# Patient Record
Sex: FEMALE | Race: WHITE | Employment: FULL TIME | URBAN - METROPOLITAN AREA
[De-identification: names, ages, dates, MRNs, and addresses within clinical notes are randomized per-mention and may not be internally consistent; named-entity substitution may affect disease eponyms.]

---
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
## 2019-04-25 ENCOUNTER — HOSPITAL ENCOUNTER (INPATIENT)
Age: 19
LOS: 1 days | Discharge: HOME | DRG: 885 | End: 2019-04-26
Attending: EMERGENCY MEDICINE | Admitting: PSYCHIATRY & NEUROLOGY
Payer: COMMERCIAL

## 2019-04-25 DIAGNOSIS — F41.8 ANXIETY ASSOCIATED WITH DEPRESSION: Primary | ICD-10-CM

## 2019-04-25 PROBLEM — F32.9 MAJOR DEPRESSIVE DISORDER: Status: ACTIVE | Noted: 2019-04-25

## 2019-04-25 LAB
ALBUMIN SERPL-MCNC: 4.3 G/DL (ref 3.5–5)
ALBUMIN/GLOB SERPL: 1.3 {RATIO} (ref 1.1–2.2)
ALP SERPL-CCNC: 51 U/L (ref 45–117)
ALT SERPL-CCNC: 21 U/L (ref 12–78)
AMPHET UR QL SCN: NEGATIVE
ANION GAP SERPL CALC-SCNC: 4 MMOL/L (ref 5–15)
APAP SERPL-MCNC: <2 UG/ML (ref 10–30)
APPEARANCE UR: ABNORMAL
AST SERPL-CCNC: 15 U/L (ref 15–37)
BACTERIA URNS QL MICRO: ABNORMAL /HPF
BARBITURATES UR QL SCN: NEGATIVE
BASOPHILS # BLD: 0.1 K/UL (ref 0–0.1)
BASOPHILS NFR BLD: 1 % (ref 0–1)
BENZODIAZ UR QL: NEGATIVE
BILIRUB SERPL-MCNC: 1.5 MG/DL (ref 0.2–1)
BILIRUB UR QL: NEGATIVE
BUN SERPL-MCNC: 9 MG/DL (ref 6–20)
BUN/CREAT SERPL: 12 (ref 12–20)
CALCIUM SERPL-MCNC: 9.2 MG/DL (ref 8.5–10.1)
CANNABINOIDS UR QL SCN: POSITIVE
CHLORIDE SERPL-SCNC: 108 MMOL/L (ref 97–108)
CO2 SERPL-SCNC: 27 MMOL/L (ref 21–32)
COCAINE UR QL SCN: NEGATIVE
COLOR UR: ABNORMAL
CREAT SERPL-MCNC: 0.73 MG/DL (ref 0.55–1.02)
DIFFERENTIAL METHOD BLD: ABNORMAL
DRUG SCRN COMMENT,DRGCM: ABNORMAL
EOSINOPHIL # BLD: 0.6 K/UL (ref 0–0.4)
EOSINOPHIL NFR BLD: 6 % (ref 0–7)
EPITH CASTS URNS QL MICRO: ABNORMAL /LPF
ERYTHROCYTE [DISTWIDTH] IN BLOOD BY AUTOMATED COUNT: 12.2 % (ref 11.5–14.5)
ETHANOL SERPL-MCNC: <10 MG/DL
GLOBULIN SER CALC-MCNC: 3.3 G/DL (ref 2–4)
GLUCOSE SERPL-MCNC: 85 MG/DL (ref 65–100)
GLUCOSE UR STRIP.AUTO-MCNC: NEGATIVE MG/DL
HCG UR QL: NEGATIVE
HCT VFR BLD AUTO: 43 % (ref 35–47)
HGB BLD-MCNC: 14.5 G/DL (ref 11.5–16)
HGB UR QL STRIP: ABNORMAL
IMM GRANULOCYTES # BLD AUTO: 0 K/UL (ref 0–0.04)
IMM GRANULOCYTES NFR BLD AUTO: 0 % (ref 0–0.5)
KETONES UR QL STRIP.AUTO: NEGATIVE MG/DL
LEUKOCYTE ESTERASE UR QL STRIP.AUTO: ABNORMAL
LYMPHOCYTES # BLD: 2.2 K/UL (ref 0.8–3.5)
LYMPHOCYTES NFR BLD: 22 % (ref 12–49)
MCH RBC QN AUTO: 34.4 PG (ref 26–34)
MCHC RBC AUTO-ENTMCNC: 33.7 G/DL (ref 30–36.5)
MCV RBC AUTO: 102.1 FL (ref 80–99)
METHADONE UR QL: NEGATIVE
MONOCYTES # BLD: 0.6 K/UL (ref 0–1)
MONOCYTES NFR BLD: 6 % (ref 5–13)
NEUTS SEG # BLD: 6.2 K/UL (ref 1.8–8)
NEUTS SEG NFR BLD: 65 % (ref 32–75)
NITRITE UR QL STRIP.AUTO: NEGATIVE
NRBC # BLD: 0 K/UL (ref 0–0.01)
NRBC BLD-RTO: 0 PER 100 WBC
OPIATES UR QL: NEGATIVE
PCP UR QL: NEGATIVE
PH UR STRIP: 7.5 [PH] (ref 5–8)
PLATELET # BLD AUTO: 309 K/UL (ref 150–400)
PMV BLD AUTO: 10.5 FL (ref 8.9–12.9)
POTASSIUM SERPL-SCNC: 4 MMOL/L (ref 3.5–5.1)
PROT SERPL-MCNC: 7.6 G/DL (ref 6.4–8.2)
PROT UR STRIP-MCNC: NEGATIVE MG/DL
RBC # BLD AUTO: 4.21 M/UL (ref 3.8–5.2)
RBC #/AREA URNS HPF: ABNORMAL /HPF (ref 0–5)
SALICYLATES SERPL-MCNC: <1.7 MG/DL (ref 2.8–20)
SODIUM SERPL-SCNC: 139 MMOL/L (ref 136–145)
SP GR UR REFRACTOMETRY: 1.01 (ref 1–1.03)
TSH SERPL DL<=0.05 MIU/L-ACNC: 2.56 UIU/ML (ref 0.36–3.74)
UR CULT HOLD, URHOLD: NORMAL
UROBILINOGEN UR QL STRIP.AUTO: 0.2 EU/DL (ref 0.2–1)
WBC # BLD AUTO: 9.7 K/UL (ref 3.6–11)
WBC URNS QL MICRO: ABNORMAL /HPF (ref 0–4)

## 2019-04-25 PROCEDURE — 65220000003 HC RM SEMIPRIVATE PSYCH

## 2019-04-25 PROCEDURE — 90791 PSYCH DIAGNOSTIC EVALUATION: CPT

## 2019-04-25 PROCEDURE — 99284 EMERGENCY DEPT VISIT MOD MDM: CPT

## 2019-04-25 PROCEDURE — 87086 URINE CULTURE/COLONY COUNT: CPT

## 2019-04-25 PROCEDURE — 84443 ASSAY THYROID STIM HORMONE: CPT

## 2019-04-25 PROCEDURE — 74011250637 HC RX REV CODE- 250/637: Performed by: PSYCHIATRY & NEUROLOGY

## 2019-04-25 PROCEDURE — 80053 COMPREHEN METABOLIC PANEL: CPT

## 2019-04-25 PROCEDURE — 36415 COLL VENOUS BLD VENIPUNCTURE: CPT

## 2019-04-25 PROCEDURE — 80307 DRUG TEST PRSMV CHEM ANLYZR: CPT

## 2019-04-25 PROCEDURE — 81001 URINALYSIS AUTO W/SCOPE: CPT

## 2019-04-25 PROCEDURE — 85025 COMPLETE CBC W/AUTO DIFF WBC: CPT

## 2019-04-25 PROCEDURE — 81025 URINE PREGNANCY TEST: CPT

## 2019-04-25 RX ORDER — HYDROXYZINE 50 MG/1
50 TABLET, FILM COATED ORAL
Status: DISCONTINUED | OUTPATIENT
Start: 2019-04-25 | End: 2019-04-26 | Stop reason: HOSPADM

## 2019-04-25 RX ORDER — BENZTROPINE MESYLATE 2 MG/1
2 TABLET ORAL
Status: DISCONTINUED | OUTPATIENT
Start: 2019-04-25 | End: 2019-04-26 | Stop reason: HOSPADM

## 2019-04-25 RX ORDER — TRAZODONE HYDROCHLORIDE 50 MG/1
50 TABLET ORAL
Status: DISCONTINUED | OUTPATIENT
Start: 2019-04-25 | End: 2019-04-26 | Stop reason: HOSPADM

## 2019-04-25 RX ORDER — BENZTROPINE MESYLATE 1 MG/ML
2 INJECTION INTRAMUSCULAR; INTRAVENOUS
Status: DISCONTINUED | OUTPATIENT
Start: 2019-04-25 | End: 2019-04-26 | Stop reason: HOSPADM

## 2019-04-25 RX ORDER — ADHESIVE BANDAGE
30 BANDAGE TOPICAL DAILY PRN
Status: DISCONTINUED | OUTPATIENT
Start: 2019-04-25 | End: 2019-04-26 | Stop reason: HOSPADM

## 2019-04-25 RX ORDER — LORAZEPAM 2 MG/ML
2 INJECTION INTRAMUSCULAR
Status: DISCONTINUED | OUTPATIENT
Start: 2019-04-25 | End: 2019-04-26 | Stop reason: HOSPADM

## 2019-04-25 RX ORDER — ACETAMINOPHEN 325 MG/1
650 TABLET ORAL
Status: DISCONTINUED | OUTPATIENT
Start: 2019-04-25 | End: 2019-04-26 | Stop reason: HOSPADM

## 2019-04-25 RX ORDER — IBUPROFEN 400 MG/1
400 TABLET ORAL
Status: DISCONTINUED | OUTPATIENT
Start: 2019-04-25 | End: 2019-04-26 | Stop reason: HOSPADM

## 2019-04-25 RX ORDER — OLANZAPINE 5 MG/1
5 TABLET ORAL
Status: DISCONTINUED | OUTPATIENT
Start: 2019-04-25 | End: 2019-04-26 | Stop reason: HOSPADM

## 2019-04-25 RX ORDER — ONDANSETRON 4 MG/1
4 TABLET, ORALLY DISINTEGRATING ORAL
Status: DISCONTINUED | OUTPATIENT
Start: 2019-04-25 | End: 2019-04-26 | Stop reason: HOSPADM

## 2019-04-25 RX ORDER — DIPHENHYDRAMINE HCL 25 MG
50 CAPSULE ORAL
Status: DISCONTINUED | OUTPATIENT
Start: 2019-04-25 | End: 2019-04-26 | Stop reason: HOSPADM

## 2019-04-25 RX ORDER — IBUPROFEN 200 MG
1 TABLET ORAL
Status: DISCONTINUED | OUTPATIENT
Start: 2019-04-25 | End: 2019-04-26 | Stop reason: HOSPADM

## 2019-04-25 RX ADMIN — HYDROXYZINE HYDROCHLORIDE 50 MG: 50 TABLET, FILM COATED ORAL at 20:37

## 2019-04-25 RX ADMIN — ONDANSETRON 4 MG: 4 TABLET, ORALLY DISINTEGRATING ORAL at 20:28

## 2019-04-25 NOTE — ED PROVIDER NOTES
22 y.o. female with no significant past medical history who presents from [address] [place] [address] with chief complaint of depression. Per mom, the pt has been experiencing some worsening anxiety over the past couple of weeks. She had been experiencing some GI issues and had a full w/u, including EGD with Dr. Charmayne Phalen which was all normal and ended up being attributed to emotional issues. The pt is not established with a PCP so the mom took her to [address] [place] [address] today to speak with someone about her anxiety. While there, they spoke to the  who was going to set the patient up in a Partial Hospitalization Program. Once she brought in the paperwork to the pt, the pt became upset and things escalated with the family. The coordinator then spoke to the pt privately where she was asked if she had any thoughts of harming herself or others. The pt states that she mentioned that \"life may be easier if (she) wasn't here;\" however, she denied any HI or SI, stating \"(she) wouldn't ever try to hurt (herself) or others. \" The pt and family were then told that if they did not bring her here for admission she would be TDO. Pt and mother state that they do not think the pt is suicidal and does not need to be admitted. Pt denies SI or HI. There are no other acute medical concerns at this time. Social hx: current everyday tobacco smoker (0.25 packs/day); (+) EtOH use (once/week); (+) marijuana use (Daily) PCP: Dangelo Gutierrez MD 
 
Note written by Lucius Seaman, as dictated by Ana Kelley MD 1:00 PM 
 
The history is provided by the patient and a parent. No  was used. History reviewed. No pertinent past medical history. Past Surgical History:  
Procedure Laterality Date  HX HEENT    
 wisdom teeth  UPPER GI ENDOSCOPY,BIOPSY  11/16/2018 Family History:  
Problem Relation Age of Onset  Other Mother   
     divericulitis, gallbladder removal  
  No Known Problems Father  Crohn's Disease Maternal Grandmother 48  
 Other Maternal Grandmother   
     divericulitis and gallbladder removal  
 Stroke Maternal Grandfather  Hypertension Maternal Grandfather  High Cholesterol Maternal Grandfather  Cataract Maternal Grandfather  Stroke Paternal Grandmother  Cancer Paternal Grandmother 79  
     kidney, breast---age 61  No Known Problems Paternal Grandfather Social History Socioeconomic History  Marital status: SINGLE Spouse name: Not on file  Number of children: Not on file  Years of education: Not on file  Highest education level: Not on file Occupational History  Not on file Social Needs  Financial resource strain: Not on file  Food insecurity:  
  Worry: Not on file Inability: Not on file  Transportation needs:  
  Medical: Not on file Non-medical: Not on file Tobacco Use  Smoking status: Current Every Day Smoker Packs/day: 0.25 Years: 1.00 Pack years: 0.25  Smokeless tobacco: Never Used Substance and Sexual Activity  Alcohol use: Yes Comment: once a week  Drug use: Yes Frequency: 7.0 times per week Types: Marijuana  Sexual activity: Yes  
  Partners: Male Birth control/protection: IUD Lifestyle  Physical activity:  
  Days per week: Not on file Minutes per session: Not on file  Stress: Not on file Relationships  Social connections:  
  Talks on phone: Not on file Gets together: Not on file Attends [employer] service: Not on file Active member of club or organization: Not on file Attends meetings of clubs or organizations: Not on file Relationship status: Not on file  Intimate partner violence:  
  Fear of current or ex partner: Not on file Emotionally abused: Not on file Physically abused: Not on file Forced sexual activity: Not on file Other Topics Concern  Not on file Social History Narrative  Not on file ALLERGIES: Patient has no known allergies. Review of Systems Constitutional: Negative for appetite change, chills and fever. HENT: Negative for rhinorrhea, sore throat and trouble swallowing. Eyes: Negative for photophobia. Respiratory: Negative for cough and shortness of breath. Cardiovascular: Negative for chest pain and palpitations. Gastrointestinal: Negative for abdominal pain, nausea and vomiting. Genitourinary: Negative for dysuria, frequency and hematuria. Musculoskeletal: Negative for arthralgias. Neurological: Negative for dizziness, syncope and weakness. Psychiatric/Behavioral: Negative for behavioral problems. The patient is nervous/anxious. All other systems reviewed and are negative. Vitals:  
 04/25/19 1127 04/25/19 1152 BP:  124/83 Pulse: 98 90 Resp:  18 Temp:  98.7 °F (37.1 °C) SpO2: 100% 100% Physical Exam  
Constitutional: She appears well-developed and well-nourished. HENT:  
Head: Normocephalic and atraumatic. Mouth/Throat: Oropharynx is clear and moist.  
Eyes: Pupils are equal, round, and reactive to light. EOM are normal.  
Neck: Normal range of motion. Neck supple. Cardiovascular: Normal rate, regular rhythm, normal heart sounds and intact distal pulses. Exam reveals no gallop and no friction rub. No murmur heard. Pulmonary/Chest: Effort normal. No respiratory distress. She has no wheezes. She has no rales. Abdominal: Soft. There is no tenderness. There is no rebound. Musculoskeletal: Normal range of motion. She exhibits no tenderness. Neurological: She is alert. No cranial nerve deficit. Motor; symmetric Skin: No erythema. Psychiatric: Her behavior is normal. Cognition and memory are normal. She exhibits a depressed mood. Behavior is calm and cooperative. Nursing note and vitals reviewed.  
  
Note written by Lucius Qiu, as dictated by Libbie Litten, MD 1:00 PM 
 
MDM Procedures PROGRESS NOTE: 
1:10 PM 
Dioni Ramires has spoken to [address] [place] [address] Pkwy. The pt's disposition is voluntary admission or TDO. They plan to admit the pt. PROGRESS NOTE: 
2:24 PM 
Pt is medically cleared.

## 2019-04-25 NOTE — ED NOTES
RN at patient's bedside to draw blood. Mother stating, \"Is all of this really nessesary? If she is not a TDO or suicidal do we really need to be here? I mean, I feel like this escalated really quickly\". RN made BSMART aware, BSMART in to speak with patient and parents.
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
## 2019-04-25 NOTE — ROUTINE PROCESS
TRANSFER - OUT REPORT: 
 
Verbal report given to Diego Vick RN (name) on Elizabeth Flow  being transferred to Room 728B (unit) for routine progression of care Report consisted of patients Situation, Background, Assessment and  
Recommendations(SBAR). Information from the following report(s) SBAR, Kardex, ED Summary and Recent Results was reviewed with the receiving nurse. Lines:    
 
Opportunity for questions and clarification was provided. Patient transported with: 
 Registered Nurse and HPD
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
## 2019-04-25 NOTE — PROGRESS NOTES
Admission Medication Reconciliation: 
Information obtained from: Patient in presence of her parents Significant PMH/Disease States:  
History reviewed. No pertinent past medical history. Chief Complaint for this Admission:  Depression Allergies:  Patient has no known allergies. Prior to Admission Medications:  
Prior to Admission Medications Prescriptions Last Dose Informant Patient Reported? Taking?  
levonorgestrel (MIRENA) 20 mcg/24 hr (5 years) IUD   Yes Yes Si Each by IntraUTERine route once. Facility-Administered Medications: None Comments/Recommendations: Patient provided information in presence of her parents. Notes: 
THC: smokes about 4x/week, \"several hits at a time\" Nicotine: smokes ~1 pack/day, may benefit from nicotine replacement therapy while in-patient Deleted: 1. Omeprazole 2. Ondansetron 3. Pantoprazole Thank you for allowing me to participate in the care of your patient. Karson HahnD, RN #[id number]

## 2019-04-25 NOTE — BSMART NOTE
Comprehensive Assessment Form Part 1 Section I - Disposition Axis I - Major Depression; Recurrent, Severe Generalized Anxiety Disorder Axis II - R/O Borderline Personality Disorder Axis III - History reviewed. No pertinent past medical history. Axis IV - substance abuse, family discord, workplace stress [place] V - 45-50 The Medical Doctor to Psychiatrist conference was not completed. The Medical Doctor is in agreement with Psychiatrist disposition because of (reason) they agree with the dispotition. The plan is admission to the behavioral health unit. The admitting Psychiatrist will be Dr. Basilia Jaime. The admitting Diagnosis is Major Depression. The Payor source is [employer]. The name of the representative was Merlinda Kelp. This was approved for 7 days. The authorization number is [id number]. Section II - Integrated Summary Summary:  Writer met with this patient face to face at [facility] emergency department. She was sent to the hospital for admission. The patient states that for the past year, she has woken up in the morning for work and has vomited. She attributes this to anxiety about going to work which is currently a major stressor for her. Her appetite has been poor and she has lost 40 pounds in three months time. She endorses passive suicidal ideation which she describes as \"things would be better if I wasn't here\". She denied homicidal ideation and perceptual disruption. Writer spoke to the patient and explained my concerns for her well being involving her weight loss and vomiting. It was explained to her that since she had been cleared by GI, and her gastric symptoms were caused by severe mental health symptoms, it was my recommendation that she be admitted to the hospital. She is voluntary for admission at this time. The patient has demonstrated mental capacity to provide informed consent. The information is given by the patient, parent and Beto Moya The Chief Complaint is depression`. The Precipitant Factors are see axis IV. Previous Hospitalizations: none The patient has not previously been in restraints. Current Psychiatrist and/or  is patient appeared for intake at [facility] today. Lethality Assessment: 
 
The potential for suicide noted by the following: ideation . The potential for homicide is not noted. The patient has not been a perpetrator of sexual or physical abuse. There are not pending charges. The patient is felt to be at risk for self harm or harm to others. The attending nurse was advised that security has been notified. Section III - Psychosocial 
The patient's overall mood and attitude is depressed. Feelings of helplessness and hopelessness are not observed. Generalized anxiety is observed by self report. Panic is observed by self report. Phobias are not observed. Obsessive compulsive tendencies are not observed. Section IV - Mental Status Exam 
The patient's appearance is unkempt. The patient's behavior is guarded. The patient is oriented to time, place, person and situation. The patient's speech shows no evidence of impairment. The patient's mood is depressed and is irritable. The range of affect is labile. The patient's thought content demonstrates no evidence of impairment. The thought process shows no evidence of impairment. The patient's perception shows no evidence of impairment. The patient's memory shows no evidence of impairment. The patient's appetite is decreased and shows signs of weight loss. The patient's sleep shows no evidence of impairment. The patient's insight is blaming. The patient's judgement is psychologically impaired. Section V - Substance Abuse The patient is using substances. The patient's urine drug screen was positive for THC. Section VI - Living Arrangements The patient is single. The patient lives with a parent.  The patient has no children. The patient does plan to return home upon discharge. The patient does not have legal issues pending. The patient's source of income comes from employment. Yazidism and cultural practices have not been voiced at this time. The patient's greatest support comes from her father and this person will be involved with the treatment. The patient has not been in an event described as horrible or outside the realm of ordinary life experience either currently or in the past. 
The patient has not been a victim of sexual/physical abuse. Section VII - Other Areas of Clinical Concern The highest grade achieved is 12th grade with the overall quality of school experience being described as not assessed. The patient is currently unemployed and speaks [place] as a primary language. The patient has no communication impairments affecting communication. The patient's preference for learning can be described as: can read and write adequately. The patient's hearing is normal.  The patient's vision is impaired and  wears glasses or contacts. ERIC Peralta, Supervisee in Social Work

## 2019-04-26 VITALS
HEART RATE: 82 BPM | DIASTOLIC BLOOD PRESSURE: 65 MMHG | SYSTOLIC BLOOD PRESSURE: 99 MMHG | TEMPERATURE: 97.9 F | BODY MASS INDEX: 23.1 KG/M2 | HEIGHT: 61 IN | OXYGEN SATURATION: 100 % | RESPIRATION RATE: 16 BRPM

## 2019-04-26 LAB
BACTERIA SPEC CULT: NORMAL
CC UR VC: NORMAL
SERVICE CMNT-IMP: NORMAL

## 2019-04-26 RX ORDER — ESCITALOPRAM OXALATE 10 MG/1
10 TABLET ORAL DAILY
Qty: 30 TAB | Refills: 0 | Status: SHIPPED | OUTPATIENT
Start: 2019-04-26 | End: 2019-04-26 | Stop reason: SDUPTHER

## 2019-04-26 RX ORDER — ESCITALOPRAM OXALATE 10 MG/1
10 TABLET ORAL DAILY
Qty: 30 TAB | Refills: 0 | Status: SHIPPED | OUTPATIENT
Start: 2019-04-26 | End: 2019-07-02 | Stop reason: DRUGHIGH

## 2019-04-26 NOTE — BH NOTES
GROUP THERAPY PROGRESS NOTE Ankita Schaeffer did not participate in a 75 minute Process Group on the General Unit with a focus identifying feelings, planning for the day, and learning about using the [address] as a long-term personal treatment plan. She may have been finalizing her discharge plans with nursing.

## 2019-04-26 NOTE — INTERDISCIPLINARY ROUNDS
Behavioral Health Interdisciplinary Rounds Patient Name: Adamaris Valenzuela  Age: 22 y.o. Room/Bed:  727/ Primary Diagnosis: <principal problem not specified> Admission Status: Voluntary / has been assessed at [facility] Readmission within 30 days: no 
Power of  in place: no  
Patient requires a blocked bed: no          Reason for blocked bed: VTE Prophylaxis: No 
 
Mobility needs/Fall risk: no 
Flu Vaccine : no  
Nutritional Plan: no 
Consults:       
Labs/Testing due today?: yes Sleep hours: 7 1/2 Participation in Care/Groups:  no 
Medication Compliant?: Yes PRNS (last 24 hours): Antianxiety and Sleep Aid Restraints (last 24 hours):  no 
  
CIWA (range last 24 hours): COWS (range last 24 hours): Alcohol screening (AUDIT) completed -   AUDIT Score: 2 If applicable, date SBIRT discussed in treatment team AND documented:  
AUDIT Screen Score: AUDIT Score: 2 Tobacco - patient is a smoker: Have You Used Tobacco in the Past 30 Days: Yes Illegal Drugs use: Have You Used Any Illegal Substances Over the Past 12 Months: Yes 
 
24 hour chart check complete:yes Patient goal(s) for today: Dev course of tx Treatment team focus/goals:  Complete Psych Social Assessment Progress note LOS:  1  Expected LOS:  
 
Financial concerns/prescription coverage:  
Date of last family contact:   
Family requesting physician contact today:  
Discharge plan: Return to live with family Guns in the home: N/A Outpatient provider(s): Beto MATHIS ( completed Intake) Participating treatment team members: BRET Justice RN, Jones HahnD

## 2019-04-26 NOTE — PROGRESS NOTES
Problem: Discharge Planning Goal: *Discharge to safe environment Outcome: Not Progressing Towards Goal 
Note: Pt plans to return home to live with family Goal: *Knowledge of medication management Outcome: Not Progressing Towards Goal 
Note: Pt will take all scheduled medications Pt know the names and usgae of all her medications Goal: *Knowledge of discharge instructions Outcome: Not Progressing Towards Goal 
Note: Pt will participate in d/c planning with Tx team by articulating her needs and concerns Problem: Patient Education: Go to Patient Education Activity Goal: Patient/Family Education Outcome: Not Progressing Towards Goal 
Note: Pt will attend all scheduled activities to gain knowledge of managing her illness and develop recovery plans

## 2019-04-26 NOTE — BH NOTES
Behavioral Health Transition Record to Provider Patient Name: Shawn Batista YOB: 2000 Medical Record Number: [id number] Date of Admission: 4/25/2019 Date of Discharge: 4/26/2019 Attending Provider: Nestor Costa MD 
Discharging Provider: Nestor Costa MD 
To contact this individual call [phone] and ask the  to page. If unavailable, ask to be transferred to [facility] Provider on call. [facility] Provider will be available on call 24/7 and during holidays. Primary Care Provider: Robinson Saxena MD 
 
No Known Allergies Reason for Admission: Anxiety Admission Diagnosis: Major depressive disorder [F32.9] Major depressive disorder [F32.9] Major depressive disorder [F32.9] * No surgery found * Results for orders placed or performed during the hospital encounter of 04/25/19 URINE CULTURE HOLD SAMPLE Result Value Ref Range Urine culture hold URINE ON HOLD IN MICROBIOLOGY DEPT FOR 3 DAYS. IF UNPRESERVED URINE IS SUBMITTED, IT CANNOT BE USED FOR ADDITIONAL TESTING AFTER 24 HRS, RECOLLECTION WILL BE REQUIRED. CULTURE, URINE Result Value Ref Range Special Requests: NO SPECIAL REQUESTS [place] Count <10,000 COLONIES/mL Culture result: NO SIGNIFICANT GROWTH URINALYSIS W/MICROSCOPIC Result Value Ref Range Color YELLOW/STRAW Appearance CLOUDY (A) CLEAR Specific gravity 1.013 1.003 - 1.030    
 pH (UA) 7.5 5.0 - 8.0 Protein NEGATIVE  NEG mg/dL Glucose NEGATIVE  NEG mg/dL Ketone NEGATIVE  NEG mg/dL Bilirubin NEGATIVE  NEG Blood MODERATE (A) NEG Urobilinogen 0.2 0.2 - 1.0 EU/dL Nitrites NEGATIVE  NEG Leukocyte Esterase TRACE (A) NEG    
 WBC 5-10 0 - 4 /hpf  
 RBC 5-10 0 - 5 /hpf Epithelial cells MANY (A) FEW /lpf Bacteria 1+ (A) NEG /hpf  
CBC WITH AUTOMATED DIFF Result Value Ref Range WBC 9.7 3.6 - 11.0 K/uL  
 RBC 4.21 3.80 - 5.20 M/uL  
 HGB 14.5 11.5 - 16.0 g/dL HCT 43.0 35.0 - 47.0 % .1 (H) 80.0 - 99.0 FL  
 MCH 34.4 (H) 26.0 - 34.0 PG  
 MCHC 33.7 30.0 - 36.5 g/dL  
 RDW 12.2 11.5 - 14.5 % PLATELET 338 300 - 019 K/uL MPV 10.5 8.9 - 12.9 FL  
 NRBC 0.0 0  WBC ABSOLUTE NRBC 0.00 0.00 - 0.01 K/uL NEUTROPHILS 65 32 - 75 % LYMPHOCYTES 22 12 - 49 % MONOCYTES 6 5 - 13 % EOSINOPHILS 6 0 - 7 % BASOPHILS 1 0 - 1 % IMMATURE GRANULOCYTES 0 0.0 - 0.5 % ABS. NEUTROPHILS 6.2 1.8 - 8.0 K/UL  
 ABS. LYMPHOCYTES 2.2 0.8 - 3.5 K/UL  
 ABS. MONOCYTES 0.6 0.0 - 1.0 K/UL  
 ABS. EOSINOPHILS 0.6 (H) 0.0 - 0.4 K/UL  
 ABS. BASOPHILS 0.1 0.0 - 0.1 K/UL  
 ABS. IMM. GRANS. 0.0 0.00 - 0.04 K/UL  
 DF AUTOMATED METABOLIC PANEL, COMPREHENSIVE Result Value Ref Range Sodium 139 136 - 145 mmol/L Potassium 4.0 3.5 - 5.1 mmol/L Chloride 108 97 - 108 mmol/L  
 CO2 27 21 - 32 mmol/L Anion gap 4 (L) 5 - 15 mmol/L Glucose 85 65 - 100 mg/dL BUN 9 6 - 20 MG/DL Creatinine 0.73 0.55 - 1.02 MG/DL  
 BUN/Creatinine ratio 12 12 - 20 GFR est AA >60 >60 ml/min/1.73m2 GFR est non-AA >60 >60 ml/min/1.73m2 Calcium 9.2 8.5 - 10.1 MG/DL Bilirubin, total 1.5 (H) 0.2 - 1.0 MG/DL  
 ALT (SGPT) 21 12 - 78 U/L  
 AST (SGOT) 15 15 - 37 U/L Alk. phosphatase 51 45 - 117 U/L Protein, total 7.6 6.4 - 8.2 g/dL Albumin 4.3 3.5 - 5.0 g/dL Globulin 3.3 2.0 - 4.0 g/dL A-G Ratio 1.3 1.1 - 2.2 ETHYL ALCOHOL Result Value Ref Range ALCOHOL(ETHYL),SERUM <80 <57 MG/DL  
SALICYLATE Result Value Ref Range Salicylate level <7.7 (L) 2.8 - 20.0 MG/DL  
ACETAMINOPHEN Result Value Ref Range Acetaminophen level <2 (L) 10 - 30 ug/mL DRUG SCREEN, URINE Result Value Ref Range AMPHETAMINES NEGATIVE  NEG    
 BARBITURATES NEGATIVE  NEG BENZODIAZEPINES NEGATIVE  NEG    
 COCAINE NEGATIVE  NEG  METHADONE NEGATIVE  NEG    
 OPIATES NEGATIVE  NEG    
 PCP(PHENCYCLIDINE) NEGATIVE  NEG    
 THC (TH-CANNABINOL) POSITIVE (A) NEG Drug screen comment (NOTE) TSH 3RD GENERATION Result Value Ref Range TSH 2.56 0.36 - 3.74 uIU/mL  
HCG URINE, QL. - POC Result Value Ref Range Pregnancy test,urine (POC) NEGATIVE  NEG Immunizations administered during this encounter: There is no immunization history on file for this patient. Screening for Metabolic Disorders for Patients on Antipsychotic Medications 
(Data obtained from the EMR) Estimated Body Mass Index Estimated body mass index is 23.1 kg/m² as calculated from the following: 
  Height as of this encounter: 5' 1\" (1.549 m). Weight as of 18: 55.5 kg (122 lb 4 oz). Vital Signs/Blood Pressure Visit Vitals BP 99/65 Pulse 82 Temp 97.9 °F (36.6 °C) Resp 16 Ht 5' 1\" (1.549 m) Comment: drivers license SpO2 100% Breastfeeding? No  
BMI 23.10 kg/m² Blood Glucose/Hemoglobin A1c Lab Results Component Value Date/Time Glucose 85 2019 12:35 PM  
 
No results found for: HBA1C, HGBE8, CRT3ZCAX Lipid Panel No results found for: CHOL, CHOLX, CHLST, [address], [id number], HDL, LDL, LDLC, DLDLP, TGLX, TRIGL, TRIGP, CHHD, CHHDX Discharge Diagnosis: Major depressive disorder (ICD-10-CM: F32.9) Discharge Plan: Patient discharged home into the care of her mother. [address] [place] [address] : 2000 MRN: [id number] The patient Tenzin Andrews exhibits the ability to control behavior in a less restrictive environment. Patient's level of functioning is improving. No assaultive/destructive behavior has been observed for the past 24 hours. No suicidal/homicidal threat or behavior has been observed for the past 24 hours. There is no evidence of serious medication side effects. Patient has not been in physical or protective restraints for at least the past 24 hours. If weapons involved, how are they secured? No weapons involved. Is patient aware of and in agreement with discharge plan? Yes Arrangements for medication:  Prescriptions given to patient. Copy of discharge instructions to provider?:  N/A Arrangements for transportation home:  Family to . Keep all follow up appointments as scheduled, continue to take prescribed medications per physician instructions. Mental health crisis number:  771 or your local mental health crisis line number at [phone]. Discharge Medication List and Instructions:  
Discharge Medication List as of 4/26/2019 10:38 AM  
  
CONTINUE these medications which have CHANGED Details  
escitalopram oxalate (LEXAPRO) 10 mg tablet Take 1 Tab by mouth daily. Indications: major depressive disorder, Print, Disp-30 Tab, R-0  
  
  
CONTINUE these medications which have NOT CHANGED Details  
levonorgestrel (MIRENA) 20 mcg/24 hr (5 years) IUD 1 Each by IntraUTERine route once., Historical Med  
  
  
 
 
Unresulted Labs (24h ago, onward) None To obtain results of studies pending at discharge, please contact [phone] Follow-up Information Follow up With Specialties Details Why Contact Info [facility]  Schedule an appointment as soon as possible for a visit Individual therapy [address], [place] [address] [facility], [address] [facility] 
[phone] 
[email or web address]  
 2020 [facility]  Schedule an appointment as soon as possible for a visit Individual therapy 3 [facility] [address], [address] 1400 [facility] [address] [place] [address] 
[phone] 
[email or web address]  
 [facility]  Schedule an appointment as soon as possible for a visit Primary care provider [address] [place] [address] 
[phone] Miguel Light MD Pediatrics   [address] [facility] 
[phone] Advanced Directive:  
Does the patient have an appointed surrogate decision maker?  No 
 Does the patient have a Medical Advance Directive? No 
Does the patient have a Psychiatric Advance Directive? No 
If the patient does not have a surrogate or Medical Advance Directive AND Psychiatric Advance Directive, the patient was offered information on these advance directives Yes and Patient declined to complete Patient Instructions: Please continue all medications until otherwise directed by physician. Tobacco Cessation Discharge Plan:  
Is the patient a smoker and needs referral for smoking cessation? Yes Patient referred to the following for smoking cessation with an appointment? Refused Patient was offered medication to assist with smoking cessation at discharge? Refused Was education for smoking cessation added to the discharge instructions? Yes Alcohol/Substance Abuse Discharge Plan:  
Does the patient have a history of substance/alcohol abuse and requires a referral for treatment? Yes Patient referred to the following for substance/alcohol abuse treatment with an appointment? Refused Patient was offered medication to assist with alcohol cessation at discharge? Refused Was education for substance/alcohol abuse added to discharge instructions? Yes Patient discharged to Home; discussed with patient/caregiver and provided to the patient/caregiver either in hard copy or electronically. PATIENT REFUSED ALL RECOMMENDED FOLLOW-UP. PATIENT REPORTED NO CURRENT PCP. NO PRACTICE AVAILABLE TO ROUTE RECORD.

## 2019-04-26 NOTE — BH NOTES
GROUP THERAPY PROGRESS NOTE [place] did not  Participated in group. Group time: 15 minutes Personal goal for participation: 
 
Goal orientation:  
Group therapy participation:  
 
Therapeutic interventions reviewed and discussed:  
 
Impression of participation:

## 2019-04-26 NOTE — BH NOTES
PSYCHOSOCIAL ASSESSMENT 
:Patient identifying info: 
Gio Barry is a 22 y.o., female admitted 4/25/2019 11:45 AM  
 
Presenting problem and precipitating factors: Pt admitted to the hospital due to anxiety, loss of weight , stress related to employment and medical issues. Pt also endorsed passive SI stating things would be better if she wasn't here  ( no plan) Mental status assessment:  Alert, oriented, calm, cooperative Collateral information: Anamaria Moy (mother [phone]) Current psychiatric /substance abuse providers and contact info: Beto MATHIS - Completed Intake - No assigned cm nor psych Previous psychiatric/substance abuse providers and response to treatment: None Family history of mental illness or substance abuse:  
 
Substance abuse history:  Yes, ETOH , tobacco, THC - daily and weekly UDS was + THC Social History Tobacco Use  Smoking status: Current Every Day Smoker Packs/day: 0.25 Years: 1.00 Pack years: 0.25  Smokeless tobacco: Never Used Substance Use Topics  Alcohol use: Yes Comment: once a week History of biomedical complications associated with substance abuse: Denies Patient's current acceptance of treatment or motivation for change: Anita Coreas Family constellation: Parents Is significant other involved? No 
 
 
Describe support system: Parents provides her greatest source of support Describe living arrangements and home environment: Pt is single, has no children and she lives with her father. Pt has expressed plans to return home to her family. Health issues: Review H&P Hospital Problems  Date Reviewed: 1/19/2017 Codes Class Noted POA Major depressive disorder ICD-10-CM: F32.9 ICD-9-CM: 296.20  4/25/2019 Unknown Trauma history: Denied Legal issues: No current legal issues History of  service: N/A Financial status: Employment Sikh/cultural factors:  Non   
 
 Education/work history: HS grad and currently employed at [employer] Have you been licensed as a health care professional (current or ): No 
 
Leisure and recreation preferences:  
 
Describe coping skills:  Ineffective and poor judgement Karl Long 2019

## 2019-04-26 NOTE — PROGRESS NOTES
[address] [facility] 60 Master Treatment Plan for Diana Sánchez Treatment Plan Initiated: 4/26/19 Treatment Plan Modalities: 
Type of Modality Amount (x minutes) Frequency (x/week) Duration (x days) Name of Responsible Staff Community & wrap-up meetings to encourage peer interactions [id number] [facility] Group psychotherapy to assist in building coping skills and internal controls 60 7 [address] [facility] Therapeutic activity groups to build coping skills 60 7 1 Elton Griffin Psychoeducation in group setting to address:  
Medication education 70 Anjelica Guzmán Coping skills Relaxation techniques Symptom management Discharge planning [address] [place] [address] 71 Spirituality 2[address] [facility] Cassy Crew 60 1 1 volunteer Recovery/AA/NA 
    volunteer Physician medication management 15 7 1 Dr. Jamal Oscar Family meeting/discharge planning [facility] THESE GOALS WILL BE MET BY 4/29/19 Problem: Depressed Mood (Adult/Pediatric) Goal: *STG: Participates in treatment plan Outcome: Progressing Towards Goal 
Note:  
AOx4, affect bright full range, Mood describes feeling stable, with increase anxiety that fluctuates with currently level being tolerable. THC use x4 week. Denies SI, no self harming behaviors. Denies striking her mother yesterday at her [facility]. Future focused and states purpose of entering [facility] and ED was to get a therapy appointment. Describes being able to function however feeling not herself with noted weight loss in past 3 months. Increase crying and morning depression. Daily goal is to d/c home. Staff focus is on offering support, coping skills education and reassurance. Goal: *STG: Verbalizes anger, guilt, and other feelings in a constructive manor Outcome: Progressing Towards Goal 
Goal: *STG: Attends activities and groups Outcome: Progressing Towards Goal 
 Goal: *STG: Demonstrates reduction in symptoms and increase in insight into coping skills/future focused Outcome: Progressing Towards Goal 
Goal: Interventions Outcome: Progressing Towards Goal

## 2019-04-26 NOTE — PROGRESS NOTES
Pharmacist Discharge Medication Reconciliation Discharging Provider: Dr. Basilia Jaime Significant PMH:  
Past Medical History:  
Diagnosis Date Major depressive disorder 4/25/2019 Chief Complaint for this Admission: Chief Complaint Patient presents with Depression Allergies: Patient has no known allergies. Discharge Medications:  
Current Discharge Medication List  
  
 
START taking these medications Details  
escitalopram oxalate (LEXAPRO) 10 mg tablet Take 1 Tab by mouth daily. Indications: major depressive disorder 
Qty: 30 Tab, Refills: 0 CONTINUE these medications which have NOT CHANGED Details  
levonorgestrel (MIRENA) 20 mcg/24 hr (5 years) IUD 1 Each by IntraUTERine route once.   
  
  
 
The patient's chart, MAR and AVS were reviewed by Christophe Kasper PHARMD.

## 2019-04-26 NOTE — PROGRESS NOTES
Problem: Anxiety-Behavioral Health (Adult/Pediatric) Goal: *STG/LTG: Complies with medication therapy Outcome: Not Progressing Towards Goal 
Variance Patient Condition 2037  Pt is anxious, tearful and sweating. Vistaril, 50 mg given.
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
## 2019-04-26 NOTE — DISCHARGE INSTRUCTIONS
DISCHARGE SUMMARY    Ángel Gilmore  : 2000  MRN: [id number]    The patient Anselmo Villa exhibits the ability to control behavior in a less restrictive environment. Patient's level of functioning is improving. No assaultive/destructive behavior has been observed for the past 24 hours. No suicidal/homicidal threat or behavior has been observed for the past 24 hours. There is no evidence of serious medication side effects. Patient has not been in physical or protective restraints for at least the past 24 hours. If weapons involved, how are they secured? No weapons involved. Is patient aware of and in agreement with discharge plan? Yes    Arrangements for medication:  Prescriptions given to patient. Copy of discharge instructions to provider?:  N/A    Arrangements for transportation home:  Family to . Keep all follow up appointments as scheduled, continue to take prescribed medications per physician instructions. Mental health crisis number:  514 or your local mental health crisis line number at [phone]. DISCHARGE SUMMARY from Nurse    PATIENT INSTRUCTIONS:    What to do at Home:  Recommended activity: Activity as tolerated,     If you experience any of the following symptoms thoughts of harming self, feeling overwhelmed with hopelessness and despair, please follow up with  your local crisis humber at [phone]. *  Please give a list of your current medications to your Primary Care Provider. *  Please update this list whenever your medications are discontinued, doses are      changed, or new medications (including over-the-counter products) are added. *  Please carry medication information at all times in case of emergency situations.     These are general instructions for a healthy lifestyle:    No smoking/ No tobacco products/ Avoid exposure to second hand smoke  Surgeon General's Warning:  Quitting smoking now greatly reduces serious risk to your health. Obesity, smoking, and sedentary lifestyle greatly increases your risk for illness    A healthy diet, regular physical exercise & weight monitoring are important for maintaining a healthy lifestyle    You may be retaining fluid if you have a history of heart failure or if you experience any of the following symptoms:  Weight gain of 3 pounds or more overnight or 5 pounds in a week, increased swelling in our hands or feet or shortness of breath while lying flat in bed. Please call your doctor as soon as you notice any of these symptoms; do not wait until your next office visit. Recognize signs and symptoms of STROKE:    F-face looks uneven    A-arms unable to move or move unevenly    S-speech slurred or non-existent    T-time-call 911 as soon as signs and symptoms begin-DO NOT go       Back to bed or wait to see if you get better-TIME IS BRAIN. Warning Signs of HEART ATTACK     Call 911 if you have these symptoms:   Chest discomfort. Most heart attacks involve discomfort in the center of the chest that lasts more than a few minutes, or that goes away and comes back. It can feel like uncomfortable pressure, squeezing, fullness, or pain.  Discomfort in other areas of the upper body. Symptoms can include pain or discomfort in one or both arms, the back, neck, jaw, or stomach.  Shortness of breath with or without chest discomfort.  Other signs may include breaking out in a cold sweat, nausea, or lightheadedness. Don't wait more than five minutes to call 911 - MINUTES MATTER! Fast action can save your life. Calling 911 is almost always the fastest way to get lifesaving treatment. Emergency Medical Services staff can begin treatment when they arrive -- up to an hour sooner than if someone gets to the hospital by car. The discharge information has been reviewed with the patient. The patient verbalized understanding.   Discharge medications reviewed with the patient and appropriate educational materials and side effects teaching were provided.   ___________________________________________________________________________________________________________________________________

## 2019-04-29 ENCOUNTER — PATIENT OUTREACH (OUTPATIENT)
Dept: OTHER | Age: 19
End: 2019-04-29

## 2019-04-29 NOTE — PROGRESS NOTES
Patient on [facility] discharge report dated 4/27/19. Left message on voicemail. Will attempt to contact again. Need to complete post-discharge assessment.

## 2019-04-30 ENCOUNTER — PATIENT OUTREACH (OUTPATIENT)
Dept: OTHER | Age: 19
End: 2019-04-30

## 2019-04-30 NOTE — PROGRESS NOTES
Second attempt to reach patient for [facility] Program, and discharge assessment. Discreet VM left. Will send UTR letter.

## 2019-04-30 NOTE — LETTER
4/30/2019 4:28 PM 
 
Ms. Tenzin Andrews [address] [address] Dear Ms. Lani Pierson, My name is Jaylene Vance, Employee Care Manager for [address] [place] [address], and I have been trying to reach you. The Employee Care  is a free-of-charge, confidential service provided to our employees and their family members covered by the [email or web address]. Part of my job is to follow up with members who have recently been in the hospital or emergency room, to help them coordinate their care and answer questions they may have about their visit. I am able to provide assistance with medication questions, scheduling needed follow-up appointments, and arranging services like home health or home medical equipment. I can also provide education regarding your hospital or ER visit as well as your medical conditions. As healthcare providers, we know that patients do better when they have close follow up with a primary care provider (PCP), especially after a hospital or emergency department visit. If you do not have a PCP, I can help you find one that is convenient to you and covered by your insurance. I can also help you understand any after visit instructions, such as what symptoms to watch out for, or any new or changed medications. Remember that you can access your After Visit Summary by logging into your [employer] account. If you do not have a [employer] account, I can help you request access. Our program is designed to provide you with the opportunity to have a [address] [place] [address] care manager partner with you for your healthcare needs. Please contact me at the below number if I can provide you with assistance for any of the above services. Sincerely, CHARLIE Benedict RN  Employee Care Manager [address] [place] [address], [address] [place] [address] [address] [facility] Cell [phone] Fax [email or web address] 
 Neil FROST [email or web address]

## 2019-05-11 ENCOUNTER — HOSPITAL ENCOUNTER (INPATIENT)
Age: 19
LOS: 3 days | Discharge: HOME | DRG: 392 | End: 2019-05-14
Attending: EMERGENCY MEDICINE | Admitting: INTERNAL MEDICINE
Payer: COMMERCIAL

## 2019-05-11 ENCOUNTER — APPOINTMENT (OUTPATIENT)
Dept: GENERAL RADIOLOGY | Age: 19
DRG: 392 | End: 2019-05-11
Attending: INTERNAL MEDICINE
Payer: COMMERCIAL

## 2019-05-11 ENCOUNTER — APPOINTMENT (OUTPATIENT)
Dept: NUCLEAR MEDICINE | Age: 19
DRG: 392 | End: 2019-05-11
Attending: INTERNAL MEDICINE
Payer: COMMERCIAL

## 2019-05-11 ENCOUNTER — APPOINTMENT (OUTPATIENT)
Dept: CT IMAGING | Age: 19
DRG: 392 | End: 2019-05-11
Attending: EMERGENCY MEDICINE
Payer: COMMERCIAL

## 2019-05-11 DIAGNOSIS — R11.2 INTRACTABLE VOMITING WITH NAUSEA, UNSPECIFIED VOMITING TYPE: ICD-10-CM

## 2019-05-11 DIAGNOSIS — R10.13 ABDOMINAL PAIN, EPIGASTRIC: Primary | ICD-10-CM

## 2019-05-11 LAB
ALBUMIN SERPL-MCNC: 4.4 G/DL (ref 3.5–5)
ALBUMIN/GLOB SERPL: 1.3 {RATIO} (ref 1.1–2.2)
ALP SERPL-CCNC: 57 U/L (ref 45–117)
ALT SERPL-CCNC: 22 U/L (ref 12–78)
AMPHET UR QL SCN: NEGATIVE
ANION GAP SERPL CALC-SCNC: 13 MMOL/L (ref 5–15)
APPEARANCE UR: CLEAR
AST SERPL-CCNC: 19 U/L (ref 15–37)
BARBITURATES UR QL SCN: NEGATIVE
BASOPHILS # BLD: 0.1 K/UL (ref 0–0.1)
BASOPHILS NFR BLD: 1 % (ref 0–1)
BENZODIAZ UR QL: NEGATIVE
BILIRUB SERPL-MCNC: 1.1 MG/DL (ref 0.2–1)
BILIRUB UR QL: NEGATIVE
BUN SERPL-MCNC: 14 MG/DL (ref 6–20)
BUN/CREAT SERPL: 18 (ref 12–20)
CALCIUM SERPL-MCNC: 9.3 MG/DL (ref 8.5–10.1)
CANNABINOIDS UR QL SCN: POSITIVE
CHLORIDE SERPL-SCNC: 104 MMOL/L (ref 97–108)
CO2 SERPL-SCNC: 23 MMOL/L (ref 21–32)
COCAINE UR QL SCN: NEGATIVE
COLOR UR: ABNORMAL
CREAT SERPL-MCNC: 0.8 MG/DL (ref 0.55–1.02)
DIFFERENTIAL METHOD BLD: ABNORMAL
DRUG SCRN COMMENT,DRGCM: ABNORMAL
EOSINOPHIL # BLD: 0.2 K/UL (ref 0–0.4)
EOSINOPHIL NFR BLD: 1 % (ref 0–7)
ERYTHROCYTE [DISTWIDTH] IN BLOOD BY AUTOMATED COUNT: 12.1 % (ref 11.5–14.5)
ERYTHROCYTE [DISTWIDTH] IN BLOOD BY AUTOMATED COUNT: 12.2 % (ref 11.5–14.5)
GLOBULIN SER CALC-MCNC: 3.4 G/DL (ref 2–4)
GLUCOSE SERPL-MCNC: 157 MG/DL (ref 65–100)
GLUCOSE UR STRIP.AUTO-MCNC: NEGATIVE MG/DL
HCT VFR BLD AUTO: 43.7 % (ref 35–47)
HCT VFR BLD AUTO: 43.7 % (ref 35–47)
HGB BLD-MCNC: 14.9 G/DL (ref 11.5–16)
HGB BLD-MCNC: 14.9 G/DL (ref 11.5–16)
HGB UR QL STRIP: NEGATIVE
IMM GRANULOCYTES # BLD AUTO: 0.1 K/UL (ref 0–0.04)
IMM GRANULOCYTES NFR BLD AUTO: 1 % (ref 0–0.5)
KETONES UR QL STRIP.AUTO: 80 MG/DL
LEUKOCYTE ESTERASE UR QL STRIP.AUTO: NEGATIVE
LIPASE SERPL-CCNC: 46 U/L (ref 73–393)
LYMPHOCYTES # BLD: 1.6 K/UL (ref 0.8–3.5)
LYMPHOCYTES NFR BLD: 9 % (ref 12–49)
MCH RBC QN AUTO: 33.6 PG (ref 26–34)
MCH RBC QN AUTO: 33.7 PG (ref 26–34)
MCHC RBC AUTO-ENTMCNC: 34.1 G/DL (ref 30–36.5)
MCHC RBC AUTO-ENTMCNC: 34.1 G/DL (ref 30–36.5)
MCV RBC AUTO: 98.4 FL (ref 80–99)
MCV RBC AUTO: 98.9 FL (ref 80–99)
METHADONE UR QL: NEGATIVE
MONOCYTES # BLD: 0.8 K/UL (ref 0–1)
MONOCYTES NFR BLD: 5 % (ref 5–13)
NEUTS SEG # BLD: 15.4 K/UL (ref 1.8–8)
NEUTS SEG NFR BLD: 83 % (ref 32–75)
NITRITE UR QL STRIP.AUTO: NEGATIVE
NRBC # BLD: 0 K/UL (ref 0–0.01)
NRBC # BLD: 0 K/UL (ref 0–0.01)
NRBC BLD-RTO: 0 PER 100 WBC
NRBC BLD-RTO: 0 PER 100 WBC
OPIATES UR QL: NEGATIVE
PCP UR QL: NEGATIVE
PH UR STRIP: 7 [PH] (ref 5–8)
PLATELET # BLD AUTO: 315 K/UL (ref 150–400)
PLATELET # BLD AUTO: 317 K/UL (ref 150–400)
PMV BLD AUTO: 10.7 FL (ref 8.9–12.9)
PMV BLD AUTO: 11.2 FL (ref 8.9–12.9)
POTASSIUM SERPL-SCNC: 4 MMOL/L (ref 3.5–5.1)
PROT SERPL-MCNC: 7.8 G/DL (ref 6.4–8.2)
PROT UR STRIP-MCNC: NEGATIVE MG/DL
RBC # BLD AUTO: 4.42 M/UL (ref 3.8–5.2)
RBC # BLD AUTO: 4.44 M/UL (ref 3.8–5.2)
SODIUM SERPL-SCNC: 140 MMOL/L (ref 136–145)
SP GR UR REFRACTOMETRY: 1.02 (ref 1–1.03)
UROBILINOGEN UR QL STRIP.AUTO: 0.2 EU/DL (ref 0.2–1)
WBC # BLD AUTO: 18.2 K/UL (ref 3.6–11)
WBC # BLD AUTO: 18.2 K/UL (ref 3.6–11)

## 2019-05-11 PROCEDURE — 96374 THER/PROPH/DIAG INJ IV PUSH: CPT

## 2019-05-11 PROCEDURE — 96361 HYDRATE IV INFUSION ADD-ON: CPT

## 2019-05-11 PROCEDURE — 83690 ASSAY OF LIPASE: CPT

## 2019-05-11 PROCEDURE — 74011250636 HC RX REV CODE- 250/636: Performed by: INTERNAL MEDICINE

## 2019-05-11 PROCEDURE — 96375 TX/PRO/DX INJ NEW DRUG ADDON: CPT

## 2019-05-11 PROCEDURE — 74011636320 HC RX REV CODE- 636/320: Performed by: EMERGENCY MEDICINE

## 2019-05-11 PROCEDURE — 81003 URINALYSIS AUTO W/O SCOPE: CPT

## 2019-05-11 PROCEDURE — 65270000015 HC RM PRIVATE ONCOLOGY

## 2019-05-11 PROCEDURE — 74011250636 HC RX REV CODE- 250/636: Performed by: EMERGENCY MEDICINE

## 2019-05-11 PROCEDURE — 80307 DRUG TEST PRSMV CHEM ANLYZR: CPT

## 2019-05-11 PROCEDURE — 85027 COMPLETE CBC AUTOMATED: CPT

## 2019-05-11 PROCEDURE — 99285 EMERGENCY DEPT VISIT HI MDM: CPT

## 2019-05-11 PROCEDURE — 85025 COMPLETE CBC W/AUTO DIFF WBC: CPT

## 2019-05-11 PROCEDURE — 36415 COLL VENOUS BLD VENIPUNCTURE: CPT

## 2019-05-11 PROCEDURE — 74177 CT ABD & PELVIS W/CONTRAST: CPT

## 2019-05-11 PROCEDURE — 80053 COMPREHEN METABOLIC PANEL: CPT

## 2019-05-11 PROCEDURE — 74011250637 HC RX REV CODE- 250/637: Performed by: INTERNAL MEDICINE

## 2019-05-11 PROCEDURE — C9113 INJ PANTOPRAZOLE SODIUM, VIA: HCPCS | Performed by: INTERNAL MEDICINE

## 2019-05-11 PROCEDURE — 74011000250 HC RX REV CODE- 250: Performed by: INTERNAL MEDICINE

## 2019-05-11 PROCEDURE — 74011250636 HC RX REV CODE- 250/636

## 2019-05-11 PROCEDURE — 71045 X-RAY EXAM CHEST 1 VIEW: CPT

## 2019-05-11 PROCEDURE — A9537 TC99M MEBROFENIN: HCPCS

## 2019-05-11 RX ORDER — KETOROLAC TROMETHAMINE 30 MG/ML
30 INJECTION, SOLUTION INTRAMUSCULAR; INTRAVENOUS
Status: COMPLETED | OUTPATIENT
Start: 2019-05-11 | End: 2019-05-11

## 2019-05-11 RX ORDER — ALPRAZOLAM 0.5 MG/1
0.5 TABLET ORAL
Status: DISCONTINUED | OUTPATIENT
Start: 2019-05-11 | End: 2019-05-13

## 2019-05-11 RX ORDER — ONDANSETRON 2 MG/ML
4 INJECTION INTRAMUSCULAR; INTRAVENOUS
Status: DISCONTINUED | OUTPATIENT
Start: 2019-05-11 | End: 2019-05-14 | Stop reason: HOSPADM

## 2019-05-11 RX ORDER — LORAZEPAM 2 MG/ML
1 INJECTION INTRAMUSCULAR
Status: COMPLETED | OUTPATIENT
Start: 2019-05-11 | End: 2019-05-11

## 2019-05-11 RX ORDER — METOCLOPRAMIDE HYDROCHLORIDE 5 MG/ML
10 INJECTION INTRAMUSCULAR; INTRAVENOUS
Status: COMPLETED | OUTPATIENT
Start: 2019-05-11 | End: 2019-05-11

## 2019-05-11 RX ORDER — FENTANYL CITRATE 50 UG/ML
100 INJECTION, SOLUTION INTRAMUSCULAR; INTRAVENOUS
Status: COMPLETED | OUTPATIENT
Start: 2019-05-11 | End: 2019-05-11

## 2019-05-11 RX ORDER — ACETAMINOPHEN 325 MG/1
650 TABLET ORAL
Status: DISCONTINUED | OUTPATIENT
Start: 2019-05-11 | End: 2019-05-14 | Stop reason: HOSPADM

## 2019-05-11 RX ORDER — KETOROLAC TROMETHAMINE 30 MG/ML
INJECTION, SOLUTION INTRAMUSCULAR; INTRAVENOUS
Status: COMPLETED
Start: 2019-05-11 | End: 2019-05-11

## 2019-05-11 RX ORDER — HALOPERIDOL 5 MG/ML
2.5 INJECTION INTRAMUSCULAR
Status: COMPLETED | OUTPATIENT
Start: 2019-05-11 | End: 2019-05-11

## 2019-05-11 RX ORDER — SODIUM CHLORIDE 9 MG/ML
100 INJECTION, SOLUTION INTRAVENOUS CONTINUOUS
Status: DISCONTINUED | OUTPATIENT
Start: 2019-05-11 | End: 2019-05-13

## 2019-05-11 RX ORDER — FENTANYL CITRATE 50 UG/ML
12.5-25 INJECTION, SOLUTION INTRAMUSCULAR; INTRAVENOUS
Status: DISCONTINUED | OUTPATIENT
Start: 2019-05-11 | End: 2019-05-14 | Stop reason: HOSPADM

## 2019-05-11 RX ORDER — SODIUM CHLORIDE 0.9 % (FLUSH) 0.9 %
5-40 SYRINGE (ML) INJECTION AS NEEDED
Status: DISCONTINUED | OUTPATIENT
Start: 2019-05-11 | End: 2019-05-14 | Stop reason: HOSPADM

## 2019-05-11 RX ORDER — LORAZEPAM 2 MG/ML
0.5 INJECTION INTRAMUSCULAR
Status: COMPLETED | OUTPATIENT
Start: 2019-05-11 | End: 2019-05-11

## 2019-05-11 RX ORDER — ESCITALOPRAM OXALATE 10 MG/1
10 TABLET ORAL DAILY
Status: DISCONTINUED | OUTPATIENT
Start: 2019-05-12 | End: 2019-05-11

## 2019-05-11 RX ORDER — ESCITALOPRAM OXALATE 10 MG/1
10 TABLET ORAL EVERY EVENING
Status: DISCONTINUED | OUTPATIENT
Start: 2019-05-11 | End: 2019-05-14 | Stop reason: HOSPADM

## 2019-05-11 RX ORDER — SODIUM CHLORIDE 0.9 % (FLUSH) 0.9 %
10 SYRINGE (ML) INJECTION
Status: COMPLETED | OUTPATIENT
Start: 2019-05-11 | End: 2019-05-11

## 2019-05-11 RX ORDER — SODIUM CHLORIDE 0.9 % (FLUSH) 0.9 %
5-40 SYRINGE (ML) INJECTION EVERY 8 HOURS
Status: DISCONTINUED | OUTPATIENT
Start: 2019-05-11 | End: 2019-05-14 | Stop reason: HOSPADM

## 2019-05-11 RX ADMIN — HALOPERIDOL LACTATE 2.5 MG: 5 INJECTION INTRAMUSCULAR at 10:55

## 2019-05-11 RX ADMIN — Medication 10 ML: at 10:42

## 2019-05-11 RX ADMIN — ESCITALOPRAM OXALATE 10 MG: 10 TABLET ORAL at 21:54

## 2019-05-11 RX ADMIN — IOPAMIDOL 100 ML: 755 INJECTION, SOLUTION INTRAVENOUS at 10:42

## 2019-05-11 RX ADMIN — KETOROLAC TROMETHAMINE 30 MG: 30 INJECTION, SOLUTION INTRAMUSCULAR at 09:59

## 2019-05-11 RX ADMIN — METOCLOPRAMIDE 10 MG: 5 INJECTION, SOLUTION INTRAMUSCULAR; INTRAVENOUS at 09:59

## 2019-05-11 RX ADMIN — ALPRAZOLAM 0.5 MG: 0.5 TABLET ORAL at 21:54

## 2019-05-11 RX ADMIN — FENTANYL CITRATE 100 MCG: 50 INJECTION, SOLUTION INTRAMUSCULAR; INTRAVENOUS at 10:55

## 2019-05-11 RX ADMIN — SODIUM CHLORIDE 40 MG: 9 INJECTION, SOLUTION INTRAMUSCULAR; INTRAVENOUS; SUBCUTANEOUS at 21:55

## 2019-05-11 RX ADMIN — SODIUM CHLORIDE 100 ML/HR: 900 INJECTION, SOLUTION INTRAVENOUS at 18:39

## 2019-05-11 RX ADMIN — KETOROLAC TROMETHAMINE 30 MG: 30 INJECTION, SOLUTION INTRAMUSCULAR; INTRAVENOUS at 09:59

## 2019-05-11 RX ADMIN — IOHEXOL: 240 INJECTION, SOLUTION INTRATHECAL; INTRAVASCULAR; INTRAVENOUS; ORAL at 11:03

## 2019-05-11 RX ADMIN — LORAZEPAM 0.5 MG: 2 INJECTION INTRAMUSCULAR; INTRAVENOUS at 16:00

## 2019-05-11 RX ADMIN — SODIUM CHLORIDE 1000 ML: 900 INJECTION, SOLUTION INTRAVENOUS at 10:53

## 2019-05-11 RX ADMIN — SODIUM CHLORIDE 1000 ML: 900 INJECTION, SOLUTION INTRAVENOUS at 10:05

## 2019-05-11 RX ADMIN — LORAZEPAM 1 MG: 2 INJECTION INTRAMUSCULAR; INTRAVENOUS at 12:05

## 2019-05-11 RX ADMIN — Medication 10 ML: at 21:56

## 2019-05-11 NOTE — ED NOTES
TRANSFER - OUT REPORT: 
 
Verbal report given to Dorothea Painting RN (name) on Chuy Gillespie  being transferred to [address] [place] [address] (unit) for routine progression of care Report consisted of patients Situation, Background, Assessment and  
Recommendations(SBAR). Information from the following report(s) SBAR was reviewed with the receiving nurse. Lines:  
Peripheral IV 05/11/19 Left Antecubital (Active) Site Assessment Clean, dry, & intact 5/11/2019  9:37 AM  
Phlebitis Assessment 0 5/11/2019  9:37 AM  
Infiltration Assessment 0 5/11/2019  9:37 AM  
Dressing Status Clean, dry, & intact 5/11/2019  9:37 AM  
Dressing Type Transparent 5/11/2019  9:37 AM  
Hub Color/Line Status Pink;Flushed 5/11/2019  9:37 AM  
Alcohol Cap Used Yes 5/11/2019  9:37 AM  
  
 
Opportunity for questions and clarification was provided.

## 2019-05-11 NOTE — PROGRESS NOTES
Pharmacy Clarification of Prior to Admission Medication Regimen The patient was interviewed regarding clarification of the prior to admission medication regimen. Patient's mother was present in room and obtained permission from patient to discuss drug regimen with visitor(s) present. Patient was questioned regarding use of any other inhalers, topical products, over the counter medications, herbal medications, vitamin products or ophthalmic/nasal/otic medication use. Information Obtained From: RX Query, Patient Pertinent Pharmacy Findings: 
Updated patient?s preferred outpatient pharmacy to: [facility]/pharmacy #[id number], [facility] [address]  
levonorgestrel (MIRENA) 20 mcg/24 hr (5 years) IUD: Patient stated that she had this agent inserted about 3 years ago. PTA medication list was corrected to the following:  
 
Prior to Admission Medications Prescriptions Last Dose Informant Patient Reported? Taking?  
escitalopram oxalate (LEXAPRO) 10 mg tablet 5/10/2019 at Unknown time  No Yes Sig: Take 1 Tab by mouth daily. Indications: major depressive disorder  
levonorgestrel (MIRENA) 20 mcg/24 hr (5 years) IUD 2016  Yes No  
Si Each by IntraUTERine route once. Facility-Administered Medications: None Thank you, 
Ha Humphreys Medication History Pharmacy Technician

## 2019-05-11 NOTE — ED PROVIDER NOTES
EMERGENCY DEPARTMENT HISTORY AND PHYSICAL EXAM 
 
 
Date: 5/11/2019 Patient Name: Stephane Melvin History of Presenting Illness Chief Complaint Patient presents with  Abdominal Pain  
  mid abd pain with nausea and vomiting History Provided By: Patient and Patient's Mother HPI: Stephane Melvin, 22 y.o. female with PMHx significant for depression and unexplained n/v, presents by POV to the ED with cc of n/v and abd pain for one year. Pt for the past year has experience n/v. Pt states seems cycles begin with vomiting first thing in the morning upon awakening. She has been seen with no prior identified cause. There has been no fever. She has not been able to identify a particular foods. Pt does smoke and does use THC. There has not been any associate diarrhea or blood in the stool. Occasionally uses PPI but not consistently everyday. There has been no fever. No urinary problems except decrease in frequency no sig changes in her menses. There are no other complaints, changes, or physical findings at this time. PCP: Nargis Alexandra MD 
 
No current facility-administered medications on file prior to encounter. Current Outpatient Medications on File Prior to Encounter Medication Sig Dispense Refill  escitalopram oxalate (LEXAPRO) 10 mg tablet Take 1 Tab by mouth daily. Indications: major depressive disorder 30 Tab 0  
 levonorgestrel (MIRENA) 20 mcg/24 hr (5 years) IUD 1 Each by IntraUTERine route once. Past History Past Medical History: 
Past Medical History:  
Diagnosis Date  Major depressive disorder 4/25/2019  Psychiatric disorder 2019 Anxiety Past Surgical History: 
Past Surgical History:  
Procedure Laterality Date  HX HEENT    
 wisdom teeth  UPPER GI ENDOSCOPY,BIOPSY  11/16/2018 Family History: 
Family History Problem Relation Age of Onset  Other Mother   
     divericulitis, gallbladder removal  
 No Known Problems Father  Crohn's Disease Maternal Grandmother 48  
 Other Maternal Grandmother   
     divericulitis and gallbladder removal  
 Stroke Maternal Grandfather  Hypertension Maternal Grandfather  High Cholesterol Maternal Grandfather  Cataract Maternal Grandfather  Stroke Paternal Grandmother  Cancer Paternal Grandmother 79  
     kidney, breast---age 61  No Known Problems Paternal Grandfather Social History: 
Social History Tobacco Use  Smoking status: Current Some Day Smoker Packs/day: 0.00 Years: 0.00 Pack years: 0.00  Smokeless tobacco: Never Used  Tobacco comment: Per pt, \"occasionally\" Substance Use Topics  Alcohol use: Yes Comment: Per pt, \"rarely\"  Drug use: Yes Frequency: 7.0 times per week Types: Marijuana Allergies: 
No Known Allergies Review of Systems Review of Systems Constitutional: Positive for fatigue and unexpected weight change (decrease). Negative for appetite change, chills and fever. HENT: Negative. Negative for congestion, rhinorrhea, sinus pressure and sore throat. Eyes: Negative. Respiratory: Negative. Negative for cough, choking, chest tightness, shortness of breath and wheezing. Cardiovascular: Negative. Negative for chest pain, palpitations and leg swelling. Gastrointestinal: Positive for abdominal pain, nausea and vomiting. Negative for blood in stool, constipation and diarrhea. Endocrine: Negative. Genitourinary: Positive for decreased urine volume. Negative for difficulty urinating, dysuria, flank pain, urgency and vaginal discharge. Musculoskeletal: Negative. Skin: Negative. Neurological: Negative. Negative for dizziness, speech difficulty, weakness, light-headedness, numbness and headaches. Psychiatric/Behavioral: Negative. Depressed over situation All other systems reviewed and are negative.  
 
 
Physical Exam  
Physical Exam  
 Constitutional: She is oriented to person, place, and time. She appears well-developed and well-nourished. No distress. HENT:  
Head: Normocephalic and atraumatic. Mouth/Throat: No oropharyngeal exudate. Dry mucus membranes Eyes: Pupils are equal, round, and reactive to light. Conjunctivae and EOM are normal.  
Neck: Normal range of motion. Neck supple. No JVD present. No tracheal deviation present. Cardiovascular: Normal rate, regular rhythm, normal heart sounds and intact distal pulses. No murmur heard. Pulmonary/Chest: Effort normal and breath sounds normal. No stridor. No respiratory distress. She has no wheezes. She has no rales. She exhibits no tenderness. Abdominal: Soft. She exhibits no distension. There is tenderness (Diffuse). There is no rebound and no guarding. Musculoskeletal: Normal range of motion. She exhibits no edema or tenderness. Neurological: She is alert and oriented to person, place, and time. No cranial nerve deficit. No gross motor or sensory deficits Skin: Skin is warm and dry. She is not diaphoretic. Psychiatric: Her behavior is normal.  
Flat affect depressed mood Nursing note and vitals reviewed. Diagnostic Study Results Labs - Recent Results (from the past 12 hour(s)) CBC W/O DIFF Collection Time: 05/11/19  9:39 AM  
Result Value Ref Range WBC 18.2 (H) 3.6 - 11.0 K/uL  
 RBC 4.44 3.80 - 5.20 M/uL  
 HGB 14.9 11.5 - 16.0 g/dL HCT 43.7 35.0 - 47.0 % MCV 98.4 80.0 - 99.0 FL  
 MCH 33.6 26.0 - 34.0 PG  
 MCHC 34.1 30.0 - 36.5 g/dL  
 RDW 12.2 11.5 - 14.5 % PLATELET 209 534 - 227 K/uL MPV 10.7 8.9 - 12.9 FL  
 NRBC 0.0 0  WBC ABSOLUTE NRBC 0.00 0.00 - 0.01 K/uL METABOLIC PANEL, COMPREHENSIVE Collection Time: 05/11/19  9:39 AM  
Result Value Ref Range Sodium 140 136 - 145 mmol/L Potassium 4.0 3.5 - 5.1 mmol/L Chloride 104 97 - 108 mmol/L  
 CO2 23 21 - 32 mmol/L  Anion gap 13 5 - 15 mmol/L  
 Glucose 157 (H) 65 - 100 mg/dL BUN 14 6 - 20 MG/DL Creatinine 0.80 0.55 - 1.02 MG/DL  
 BUN/Creatinine ratio 18 12 - 20 GFR est AA >60 >60 ml/min/1.73m2 GFR est non-AA >60 >60 ml/min/1.73m2 Calcium 9.3 8.5 - 10.1 MG/DL Bilirubin, total 1.1 (H) 0.2 - 1.0 MG/DL  
 ALT (SGPT) 22 12 - 78 U/L  
 AST (SGOT) 19 15 - 37 U/L Alk. phosphatase 57 45 - 117 U/L Protein, total 7.8 6.4 - 8.2 g/dL Albumin 4.4 3.5 - 5.0 g/dL Globulin 3.4 2.0 - 4.0 g/dL A-G Ratio 1.3 1.1 - 2.2 LIPASE Collection Time: 05/11/19  9:39 AM  
Result Value Ref Range Lipase 46 (L) 73 - 393 U/L  
URINALYSIS W/ RFLX MICROSCOPIC Collection Time: 05/11/19 11:03 AM  
Result Value Ref Range Color YELLOW/STRAW Appearance CLEAR CLEAR Specific gravity 1.020 1.003 - 1.030    
 pH (UA) 7.0 5.0 - 8.0 Protein NEGATIVE  NEG mg/dL Glucose NEGATIVE  NEG mg/dL Ketone 80 (A) NEG mg/dL Bilirubin NEGATIVE  NEG Blood NEGATIVE  NEG Urobilinogen 0.2 0.2 - 1.0 EU/dL Nitrites NEGATIVE  NEG Leukocyte Esterase NEGATIVE  NEG    
DRUG SCREEN, URINE Collection Time: 05/11/19 11:03 AM  
Result Value Ref Range AMPHETAMINES NEGATIVE  NEG    
 BARBITURATES NEGATIVE  NEG BENZODIAZEPINES NEGATIVE  NEG    
 COCAINE NEGATIVE  NEG METHADONE NEGATIVE  NEG    
 OPIATES NEGATIVE  NEG    
 PCP(PHENCYCLIDINE) NEGATIVE  NEG    
 THC (TH-CANNABINOL) POSITIVE (A) NEG Drug screen comment (NOTE) Radiologic Studies -  
CT ABD PELV W CONT    (Results Pending) CT Results  (Last 48 hours) None CXR Results  (Last 48 hours) None Medical Decision Making I am the first provider for this patient. I reviewed the vital signs, available nursing notes, past medical history, past surgical history, family history and social history. Vital Signs-Reviewed the patient's vital signs. Patient Vitals for the past 12 hrs: 
 Temp Pulse Resp BP SpO2 05/11/19 1100  70 17 113/62 100 % 05/11/19 1033  70 16 106/68 91 % 05/11/19 1030  82 16 106/68 98 % 05/11/19 1000 97.4 °F (36.3 °C) 71 17 (!) 114/91 100 % 05/11/19 0925  77 18 116/71 99 % Pulse Oximetry Analysis - 99% on RA Cardiac Monitor:  
Rate: 74 bpm 
Rhythm: Normal Sinus Rhythm Records Reviewed: Nursing Notes, Old Medical Records, Previous Radiology Studies and Previous Laboratory Studies Provider Notes (Medical Decision Making): DDx- Dehydration, cannabinoid induced hyperemesis, gastritis, colitis, UTI, gastroparesis ED Course:  
Initial assessment performed. The patients presenting problems have been discussed, and they are in agreement with the care plan formulated and outlined with them. I have encouraged them to ask questions as they arise throughout their visit. Critical Care Time:  
None Disposition: 
Admit- Mother and patient very frustrated given length of time issues have been on-going with dx or tx plan. Prior EGD with some reflux changes. No colonoscopy, HIDA, or gastric emptying study. Will discuss with Hospitalist for admission. Consult Note: 
Case discussed with Dr. Joslyn Fitzgerald. He is aware and pt can be seen as outpt with Amari Espinoza, or if pt is admitted he can see tomorrow. Would recommend f/u colonoscopy as outpt or possibly while inpt. Consult Note: DIscussed with Prasad Damico who will see and evaluate pt for admission. PLAN: 
1. ADMIT Diagnosis Clinical Impression: 1. Abdominal pain, epigastric 2. Intractable vomiting with nausea, unspecified vomiting type   
 
 
 
 
]

## 2019-05-11 NOTE — ED NOTES
Pt tolerating minimal PO-she is not vomiting at this time but doesn't feel well enough to eat or drink anything. 2L NS IVF infused; pt has not voided since providing urine sample. Family remains at bedside. Lights dimmed for comfort. Will continue to monitor.
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
## 2019-05-11 NOTE — ED NOTES
Pt appears to be resting more comfortably and reports feeling \"a little better. \" Her mother remains at bedside. Will continue to monitor.

## 2019-05-11 NOTE — H&P
Hospitalist Admission NoteNAME: Camilla Frost :  2000 MRN:  [id number] Date/Time:  2019 3:45 PM 
 
Patient PCP: Jhony Harris MD 
______________________________________________________________________ Given the patient's current clinical presentation, I have a high level of concern for decompensation if discharged from the emergency department. Complex decision making was performed, which includes reviewing the patient's available past medical records, laboratory results, and x-ray films. My assessment of this patient's clinical condition and my plan of care is as follows. Assessment / Plan: 
Epigastric pain with Intractable nausea, vomiting & diarrhea Seems to have failed OP workup and is been symptomatic for months and have lost > 40lbs, unintentional. Per chart review, she used to be 161.3lbs in 2017 and now 114lbs Admit NPO 
DDx - IBS, Chronic Cholecystitis, Inflammatory Bowel Disease, Cyclic Vomiting/marujana related S/p US in 2017 without stone S/P recent EGD IVF Check HIDA Scan, if positive then surgery consult ED spoke to GI, awaiting formal consult PRN IV fentanyl and Zofran for symptomatic management Lipase WNL Did have Elevated Bili recently, now better Check stool for WBCs, Cultures, Giardia, Ova/Parasites CT A/P in ED in No acute abdominal or pelvic abnormality. Periportal edema which can be seen with vague uterus hydration. Clinical correlation is needed. Intrauterine device. Leukocytosis Suspect reactive from above Monitor No source of infection, UA negative, CT A/P without apparent source Check CXR No indication of abx at this time. Monitor for fever. If remain elevated without source of infection then may need hematology advice Major depressive disorder Continue antidepressants Code Status: Full Surrogate Decision Maker: Mother DVT Prophylaxis: SCDs as in case require procedure Baseline: functional  
  
 Subjective: CHIEF COMPLAINT: nausea, vomiting and abdominal pain HISTORY OF PRESENT ILLNESS:    
Leopold Brands is a 22 y.o.  female who presents with nausea, vomiting, abdominal pain. As per patient, she is been having symptoms on daily basis for past many months. She is been constantly loosing weight for past severe months and reported more then 40lbs and is not been trying to loose weight. Pt reported 8/10 pain in epigastric region, non radiating, sharp in nature, mostly starts when she wakes up. She also reported diarrhea, constant vomiting and can't even tell how many episodes of vomiting she had. Per reported underwent EGD last dec or January, per chart review she also had US done in 2017. Pt reported chills at home but denies fever, chest pain, cough, problems urination. We were asked to admit for work up and evaluation of the above problems. Past Medical History:  
Diagnosis Date  Major depressive disorder 4/25/2019  Psychiatric disorder 2019 Anxiety Past Surgical History:  
Procedure Laterality Date  HX HEENT    
 wisdom teeth  UPPER GI ENDOSCOPY,BIOPSY  11/16/2018 Social History Tobacco Use  Smoking status: Current Some Day Smoker Packs/day: 0.00 Years: 0.00 Pack years: 0.00  Smokeless tobacco: Never Used  Tobacco comment: Per pt, \"occasionally\" Substance Use Topics  Alcohol use: Yes Comment: Per pt, \"rarely\" Family History Problem Relation Age of Onset  Other Mother   
     divericulitis, gallbladder removal  
 No Known Problems Father  Crohn's Disease Maternal Grandmother 48  
 Other Maternal Grandmother   
     divericulitis and gallbladder removal  
 Stroke Maternal Grandfather  Hypertension Maternal Grandfather  High Cholesterol Maternal Grandfather  Cataract Maternal Grandfather  Stroke Paternal Grandmother  Cancer Paternal Grandmother 79  
     kidney, breast---age 61  
  No Known Problems Paternal Grandfather No Known Allergies Prior to Admission medications Medication Sig Start Date End Date Taking? Authorizing Provider  
escitalopram oxalate (LEXAPRO) 10 mg tablet Take 1 Tab by mouth daily. Indications: major depressive disorder 4/26/19  Yes Silva Berry MD  
levonorgestrel (MIRENA) 20 mcg/24 hr (5 years) IUD 1 Each by IntraUTERine route once. Provider, Historical  
 
 
REVIEW OF SYSTEMS:    
I am not able to complete the review of systems because: The patient is intubated and sedated The patient has altered mental status due to his acute medical problems The patient has baseline aphasia from prior stroke(s) The patient has baseline dementia and is not reliable historian The patient is in acute medical distress and unable to provide information Total of 12 systems reviewed as follows:   
   POSITIVE= underlined text  Negative = text not underlined General:  fever, chills, sweats, generalized weakness, weight loss/gain,  
   loss of appetite Eyes:    blurred vision, eye pain, loss of vision, double vision ENT:    rhinorrhea, pharyngitis Respiratory:   cough, sputum production, SOB, LEO, wheezing, pleuritic pain  
Cardiology:   chest pain, palpitations, orthopnea, PND, edema, syncope Gastrointestinal:  abdominal pain , N/V, diarrhea, dysphagia, constipation, bleeding Genitourinary:  frequency, urgency, dysuria, hematuria, incontinence Muskuloskeletal :  arthralgia, myalgia, back pain Hematology:  easy bruising, nose or gum bleeding, lymphadenopathy Dermatological: rash, ulceration, pruritis, color change / jaundice Endocrine:   hot flashes or polydipsia Neurological:  headache, dizziness, confusion, focal weakness, paresthesia, Speech difficulties, memory loss, gait difficulty Psychological: Feelings of anxiety, depression, agitation Objective: VITALS:   
Visit Vitals /42 (BP 1 Location: Right arm, BP Patient Position: At rest) Pulse 69 Temp 97.4 °F (36.3 °C) Resp 16 Wt 51.7 kg (114 lb) SpO2 99% BMI 21.54 kg/m² PHYSICAL EXAM: 
 
 
_______________________________________________________________________ Care Plan discussed with: 
  Comments Patient y Family  y Mother at bedside RN y   
Care Manager Consultant:  garrett ED physician  
_______________________________________________________________________ Expected  Disposition:  
Home with Family y HH/PT/OT/RN   
SNF/LTC   
IVÁN   
________________________________________________________________________ TOTAL TIME: 60 Minutes Critical Care Provided     Minutes non procedure based Comments  
 y Reviewed previous records  
>50% of visit spent in counseling and coordination of care y Discussion with patient and family and questions answered 
  
 
________________________________________________________________________ Signed: Radha Banks MD 
 
Procedures: see electronic medical records for all procedures/Xrays and details which were not copied into this note but were reviewed prior to creation of Plan. LAB DATA REVIEWED:   
Recent Results (from the past 24 hour(s)) CBC W/O DIFF Collection Time: 05/11/19  9:39 AM  
Result Value Ref Range WBC 18.2 (H) 3.6 - 11.0 K/uL  
 RBC 4.44 3.80 - 5.20 M/uL  
 HGB 14.9 11.5 - 16.0 g/dL HCT 43.7 35.0 - 47.0 % MCV 98.4 80.0 - 99.0 FL  
 MCH 33.6 26.0 - 34.0 PG  
 MCHC 34.1 30.0 - 36.5 g/dL  
 RDW 12.2 11.5 - 14.5 % PLATELET 261 584 - 810 K/uL MPV 10.7 8.9 - 12.9 FL  
 NRBC 0.0 0  WBC ABSOLUTE NRBC 0.00 0.00 - 0.01 K/uL METABOLIC PANEL, COMPREHENSIVE Collection Time: 05/11/19  9:39 AM  
Result Value Ref Range Sodium 140 136 - 145 mmol/L Potassium 4.0 3.5 - 5.1 mmol/L Chloride 104 97 - 108 mmol/L  
 CO2 23 21 - 32 mmol/L Anion gap 13 5 - 15 mmol/L Glucose 157 (H) 65 - 100 mg/dL BUN 14 6 - 20 MG/DL Creatinine 0.80 0.55 - 1.02 MG/DL  
 BUN/Creatinine ratio 18 12 - 20 GFR est AA >60 >60 ml/min/1.73m2 GFR est non-AA >60 >60 ml/min/1.73m2 Calcium 9.3 8.5 - 10.1 MG/DL Bilirubin, total 1.1 (H) 0.2 - 1.0 MG/DL  
 ALT (SGPT) 22 12 - 78 U/L  
 AST (SGOT) 19 15 - 37 U/L Alk. phosphatase 57 45 - 117 U/L Protein, total 7.8 6.4 - 8.2 g/dL Albumin 4.4 3.5 - 5.0 g/dL Globulin 3.4 2.0 - 4.0 g/dL A-G Ratio 1.3 1.1 - 2.2 LIPASE Collection Time: 05/11/19  9:39 AM  
Result Value Ref Range Lipase 46 (L) 73 - 393 U/L  
CBC WITH AUTOMATED DIFF Collection Time: 05/11/19  9:39 AM  
Result Value Ref Range WBC 18.2 (H) 3.6 - 11.0 K/uL  
 RBC 4.42 3.80 - 5.20 M/uL  
 HGB 14.9 11.5 - 16.0 g/dL HCT 43.7 35.0 - 47.0 % MCV 98.9 80.0 - 99.0 FL  
 MCH 33.7 26.0 - 34.0 PG  
 MCHC 34.1 30.0 - 36.5 g/dL  
 RDW 12.1 11.5 - 14.5 % PLATELET 740 645 - 778 K/uL MPV 11.2 8.9 - 12.9 FL  
 NRBC 0.0 0  WBC ABSOLUTE NRBC 0.00 0.00 - 0.01 K/uL NEUTROPHILS 83 (H) 32 - 75 % LYMPHOCYTES 9 (L) 12 - 49 % MONOCYTES 5 5 - 13 % EOSINOPHILS 1 0 - 7 % BASOPHILS 1 0 - 1 % IMMATURE GRANULOCYTES 1 (H) 0.0 - 0.5 % ABS. NEUTROPHILS 15.4 (H) 1.8 - 8.0 K/UL  
 ABS. LYMPHOCYTES 1.6 0.8 - 3.5 K/UL  
 ABS. MONOCYTES 0.8 0.0 - 1.0 K/UL  
 ABS. EOSINOPHILS 0.2 0.0 - 0.4 K/UL  
 ABS. BASOPHILS 0.1 0.0 - 0.1 K/UL  
 ABS. IMM. GRANS. 0.1 (H) 0.00 - 0.04 K/UL  
 DF AUTOMATED URINALYSIS W/ RFLX MICROSCOPIC Collection Time: 05/11/19 11:03 AM  
Result Value Ref Range Color YELLOW/STRAW Appearance CLEAR CLEAR Specific gravity 1.020 1.003 - 1.030    
 pH (UA) 7.0 5.0 - 8.0 Protein NEGATIVE  NEG mg/dL Glucose NEGATIVE  NEG mg/dL Ketone 80 (A) NEG mg/dL Bilirubin NEGATIVE  NEG Blood NEGATIVE  NEG Urobilinogen 0.2 0.2 - 1.0 EU/dL Nitrites NEGATIVE  NEG Leukocyte Esterase NEGATIVE  NEG    
DRUG SCREEN, URINE Collection Time: 05/11/19 11:03 AM  
Result Value Ref Range AMPHETAMINES NEGATIVE  NEG    
 BARBITURATES NEGATIVE  NEG BENZODIAZEPINES NEGATIVE  NEG    
 COCAINE NEGATIVE  NEG METHADONE NEGATIVE  NEG    
 OPIATES NEGATIVE  NEG    
 PCP(PHENCYCLIDINE) NEGATIVE  NEG    
 THC (TH-CANNABINOL) POSITIVE (A) NEG Drug screen comment (NOTE)

## 2019-05-11 NOTE — ED NOTES
Assumed care of patient. Patient placed in position of comfort. Call bell in reach. Skin warm and dry. Respirations even and unlabored. In no apparent distress at this time. Pt presents ambulatory into the ED accompanied by her mother, with c/o epigastric abdominal pain, n/v/d that woke her this morning around 6 am. Reports history of similar episodes-per her mother she has had an EGD performed \"and it just showed esophagitis. \" Recently started taking Lexapro for anxiety-per her mother, Kendrick Ewing thought her anxiety may have been contributing to her symptoms. \" Pillow and blanket provided. Curtain closed for privacy. Pt's mother remains at bedside. Will continue to monitor.

## 2019-05-11 NOTE — ED NOTES
Pt resting in position of comfort with eyes closed. Family at bedside. Skin warm and dry. Respirations even and unlabored. In no apparent distress at this time.

## 2019-05-11 NOTE — ED NOTES
Skin warm and dry. Respirations even and unlabored. In no apparent distress at this time. Tolerating PO at this time. Family remains at bedside.

## 2019-05-12 ENCOUNTER — APPOINTMENT (OUTPATIENT)
Dept: ULTRASOUND IMAGING | Age: 19
DRG: 392 | End: 2019-05-12
Attending: SPECIALIST
Payer: COMMERCIAL

## 2019-05-12 LAB
ALBUMIN SERPL-MCNC: 3.2 G/DL (ref 3.5–5)
ALBUMIN/GLOB SERPL: 1.3 {RATIO} (ref 1.1–2.2)
ALP SERPL-CCNC: 42 U/L (ref 45–117)
ALT SERPL-CCNC: 15 U/L (ref 12–78)
ANION GAP SERPL CALC-SCNC: 10 MMOL/L (ref 5–15)
AST SERPL-CCNC: 15 U/L (ref 15–37)
BASOPHILS # BLD: 0.1 K/UL (ref 0–0.1)
BASOPHILS NFR BLD: 1 % (ref 0–1)
BILIRUB SERPL-MCNC: 1.6 MG/DL (ref 0.2–1)
BUN SERPL-MCNC: 11 MG/DL (ref 6–20)
BUN/CREAT SERPL: 20 (ref 12–20)
CALCIUM SERPL-MCNC: 8.1 MG/DL (ref 8.5–10.1)
CHLORIDE SERPL-SCNC: 109 MMOL/L (ref 97–108)
CO2 SERPL-SCNC: 22 MMOL/L (ref 21–32)
CREAT SERPL-MCNC: 0.56 MG/DL (ref 0.55–1.02)
CRP SERPL-MCNC: 0.49 MG/DL (ref 0–0.6)
DIFFERENTIAL METHOD BLD: ABNORMAL
EOSINOPHIL # BLD: 0.3 K/UL (ref 0–0.4)
EOSINOPHIL NFR BLD: 3 % (ref 0–7)
ERYTHROCYTE [DISTWIDTH] IN BLOOD BY AUTOMATED COUNT: 12.3 % (ref 11.5–14.5)
ERYTHROCYTE [SEDIMENTATION RATE] IN BLOOD: 6 MM/HR (ref 0–20)
GLOBULIN SER CALC-MCNC: 2.5 G/DL (ref 2–4)
GLUCOSE SERPL-MCNC: 65 MG/DL (ref 65–100)
HCT VFR BLD AUTO: 35.5 % (ref 35–47)
HGB BLD-MCNC: 12.1 G/DL (ref 11.5–16)
IMM GRANULOCYTES # BLD AUTO: 0 K/UL (ref 0–0.04)
IMM GRANULOCYTES NFR BLD AUTO: 0 % (ref 0–0.5)
LYMPHOCYTES # BLD: 2.4 K/UL (ref 0.8–3.5)
LYMPHOCYTES NFR BLD: 22 % (ref 12–49)
MCH RBC QN AUTO: 33.8 PG (ref 26–34)
MCHC RBC AUTO-ENTMCNC: 34.1 G/DL (ref 30–36.5)
MCV RBC AUTO: 99.2 FL (ref 80–99)
MONOCYTES # BLD: 0.7 K/UL (ref 0–1)
MONOCYTES NFR BLD: 7 % (ref 5–13)
NEUTS SEG # BLD: 7.3 K/UL (ref 1.8–8)
NEUTS SEG NFR BLD: 67 % (ref 32–75)
NRBC # BLD: 0 K/UL (ref 0–0.01)
NRBC BLD-RTO: 0 PER 100 WBC
PLATELET # BLD AUTO: 259 K/UL (ref 150–400)
PMV BLD AUTO: 10.9 FL (ref 8.9–12.9)
POTASSIUM SERPL-SCNC: 3.5 MMOL/L (ref 3.5–5.1)
PROT SERPL-MCNC: 5.7 G/DL (ref 6.4–8.2)
RBC # BLD AUTO: 3.58 M/UL (ref 3.8–5.2)
SODIUM SERPL-SCNC: 141 MMOL/L (ref 136–145)
WBC # BLD AUTO: 10.8 K/UL (ref 3.6–11)

## 2019-05-12 PROCEDURE — C9113 INJ PANTOPRAZOLE SODIUM, VIA: HCPCS | Performed by: INTERNAL MEDICINE

## 2019-05-12 PROCEDURE — 83520 IMMUNOASSAY QUANT NOS NONAB: CPT

## 2019-05-12 PROCEDURE — 74011000250 HC RX REV CODE- 250: Performed by: INTERNAL MEDICINE

## 2019-05-12 PROCEDURE — 86140 C-REACTIVE PROTEIN: CPT

## 2019-05-12 PROCEDURE — 36415 COLL VENOUS BLD VENIPUNCTURE: CPT

## 2019-05-12 PROCEDURE — 74011250637 HC RX REV CODE- 250/637: Performed by: INTERNAL MEDICINE

## 2019-05-12 PROCEDURE — 83993 ASSAY FOR CALPROTECTIN FECAL: CPT

## 2019-05-12 PROCEDURE — 85652 RBC SED RATE AUTOMATED: CPT

## 2019-05-12 PROCEDURE — 74011250637 HC RX REV CODE- 250/637: Performed by: SPECIALIST

## 2019-05-12 PROCEDURE — 86003 ALLG SPEC IGE CRUDE XTRC EA: CPT

## 2019-05-12 PROCEDURE — 74011250636 HC RX REV CODE- 250/636: Performed by: INTERNAL MEDICINE

## 2019-05-12 PROCEDURE — 65270000015 HC RM PRIVATE ONCOLOGY

## 2019-05-12 PROCEDURE — 85025 COMPLETE CBC W/AUTO DIFF WBC: CPT

## 2019-05-12 PROCEDURE — 86038 ANTINUCLEAR ANTIBODIES: CPT

## 2019-05-12 PROCEDURE — 76700 US EXAM ABDOM COMPLETE: CPT

## 2019-05-12 PROCEDURE — 86671 FUNGUS NES ANTIBODY: CPT

## 2019-05-12 PROCEDURE — 80053 COMPREHEN METABOLIC PANEL: CPT

## 2019-05-12 PROCEDURE — 82784 ASSAY IGA/IGD/IGG/IGM EACH: CPT

## 2019-05-12 RX ORDER — SUCRALFATE 1 G/1
1 TABLET ORAL
Status: DISCONTINUED | OUTPATIENT
Start: 2019-05-12 | End: 2019-05-14 | Stop reason: HOSPADM

## 2019-05-12 RX ADMIN — SUCRALFATE 1 G: 1 TABLET ORAL at 12:36

## 2019-05-12 RX ADMIN — SUCRALFATE 1 G: 1 TABLET ORAL at 10:07

## 2019-05-12 RX ADMIN — SUCRALFATE 1 G: 1 TABLET ORAL at 17:10

## 2019-05-12 RX ADMIN — SODIUM CHLORIDE 100 ML/HR: 900 INJECTION, SOLUTION INTRAVENOUS at 17:10

## 2019-05-12 RX ADMIN — Medication 10 ML: at 05:10

## 2019-05-12 RX ADMIN — ESCITALOPRAM OXALATE 10 MG: 10 TABLET ORAL at 22:16

## 2019-05-12 RX ADMIN — SODIUM CHLORIDE 100 ML/HR: 900 INJECTION, SOLUTION INTRAVENOUS at 04:55

## 2019-05-12 RX ADMIN — FENTANYL CITRATE 12.5 MCG: 50 INJECTION, SOLUTION INTRAMUSCULAR; INTRAVENOUS at 20:04

## 2019-05-12 RX ADMIN — Medication 10 ML: at 22:17

## 2019-05-12 RX ADMIN — ALPRAZOLAM 0.5 MG: 0.5 TABLET ORAL at 22:16

## 2019-05-12 RX ADMIN — ALPRAZOLAM 0.5 MG: 0.5 TABLET ORAL at 10:07

## 2019-05-12 RX ADMIN — Medication 10 ML: at 15:23

## 2019-05-12 RX ADMIN — SODIUM CHLORIDE 40 MG: 9 INJECTION, SOLUTION INTRAMUSCULAR; INTRAVENOUS; SUBCUTANEOUS at 22:17

## 2019-05-12 RX ADMIN — SODIUM CHLORIDE 40 MG: 9 INJECTION, SOLUTION INTRAMUSCULAR; INTRAVENOUS; SUBCUTANEOUS at 10:02

## 2019-05-12 NOTE — PROGRESS NOTES
Problem: Falls - Risk of 
Goal: *Absence of Falls Description Document Iris Solis Fall Risk and appropriate interventions in the flowsheet. Outcome: Progressing Towards Goal 
Note:  
Fall Risk Interventions: 
  
 
  
 
Medication Interventions: Teach patient to arise slowly, Evaluate medications/consider consulting pharmacy

## 2019-05-12 NOTE — CONSULTS
Gastroenterology Consultation Note  Dr. Adkins Flow    NAME: Isi Matos : 2000 MRN: [id number]   PCP: Minh Gilliland MD   Primary GI : Dr. Emigdio Scott  Date/Time:  2019 7:00 AM  Subjective:   REASON FOR CONSULT:      Luca Connolly is a 22 y.o.  female who I was asked to see for severe N/V. Patient seen with father at bedside. She states that starting a year ago she began to wake with severe nausea and would start to vomit and these episodes would last for hours and lead to dehydration. Would experience upper abdominal pain after vomiting not before. ++ Stress could exacerbate sxs. Also would have diarrhea during these episodes but not after certain foods. No headaches or precipitating food intake. Had an evaluation in 2018 and Dr. Emigdio Scott performed an EGD at that time essentially negative but EG junction with some reflux changes. She has been on pantoprazole periodically but not daily. She does eat a late supper and sometimes sleeps on her stomach. Will wake up with a \"blob\" in her chest then become nauseated and vomit. Does not have any dysphagia. No constipation or abdominal bloating. Per chart smokes marijuana a couple times weekly. Has lost weight in past year down from 150-160 range to 114 lbs. There are several family members on mom's side of family with Crohn's (GM and uncle). No blood in stool. Has become so upset with her sxs recently admitted to psych for 48 hrs for major depression. U/S 2017: NEG  CT abd/pelvis with contrast done in ER:  IMPRESSION:   1. No acute abdominal or pelvic abnormality. 2. Periportal edema which can be seen with vague uterus hydration. Clinical  correlation is needed. 3. Intrauterine device.     HIDA: NEG (no CCK given)      Past Medical History:   Diagnosis Date    Major depressive disorder 2019    Psychiatric disorder 2019    Anxiety      Past Surgical History:   Procedure Laterality Date    HX HEENT wisdom teeth    UPPER GI ENDOSCOPY,BIOPSY  2018          Social History     Tobacco Use    Smoking status: Current Some Day Smoker     Packs/day: 0.00     Years: 0.00     Pack years: 0.00    Smokeless tobacco: Never Used    Tobacco comment: Per pt, \"occasionally\"   Substance Use Topics    Alcohol use: Yes     Comment: Per pt, \"rarely\"      Family History   Problem Relation Age of Onset    Other Mother         divericulitis, gallbladder removal    No Known Problems Father     Crohn's Disease Maternal Grandmother 48    Other Maternal Grandmother         divericulitis and gallbladder removal    Stroke Maternal Grandfather     Hypertension Maternal Grandfather     High Cholesterol Maternal Grandfather     Cataract Maternal Grandfather     Stroke Paternal Grandmother     Cancer Paternal Grandmother 75        kidney, breast---age 61    No Known Problems Paternal Grandfather       No Known Allergies   Home Medications:  Prior to Admission Medications   Prescriptions Last Dose Informant Patient Reported? Taking?   escitalopram oxalate (LEXAPRO) 10 mg tablet 5/10/2019 at Unknown time  No Yes   Sig: Take 1 Tab by mouth daily. Indications: major depressive disorder   levonorgestrel (MIRENA) 20 mcg/24 hr (5 years) IUD 2016  Yes No   Si Each by IntraUTERine route once.       Facility-Administered Medications: None     Hospital medications:  Current Facility-Administered Medications   Medication Dose Route Frequency    0.9% sodium chloride infusion  100 mL/hr IntraVENous CONTINUOUS    sodium chloride (NS) flush 5-40 mL  5-40 mL IntraVENous Q8H    sodium chloride (NS) flush 5-40 mL  5-40 mL IntraVENous PRN    acetaminophen (TYLENOL) tablet 650 mg  650 mg Oral Q6H PRN    ondansetron (ZOFRAN) injection 4 mg  4 mg IntraVENous Q4H PRN    fentaNYL citrate (PF) injection 12.5-25 mcg  12.5-25 mcg IntraVENous Q2H PRN    pantoprazole (PROTONIX) 40 mg in sodium chloride 0.9% 10 mL injection  40 mg IntraVENous Q12H    ALPRAZolam (XANAX) tablet 0.5 mg  0.5 mg Oral Q12H PRN    escitalopram oxalate (LEXAPRO) tablet 10 mg  10 mg Oral QPM     REVIEW OF SYSTEMS:    Review of Systems -   History obtained from chart review and the patient  General ROS: positive for  - night sweats  Psychological ROS: negative  Hematological and Lymphatic ROS: positive for - blood transfusions and bruising  Respiratory ROS: no cough, shortness of breath, or wheezing  Cardiovascular ROS: no chest pain or dyspnea on exertion  Gastrointestinal ROS: per HPI   Genito-Urinary ROS: no dysuria, trouble voiding, or hematuria  Musculoskeletal ROS: negative  Neurological ROS: no TIA or stroke symptoms  Dermatological ROS: negative    Objective:   VITALS:    Visit Vitals  /47   Pulse 87   Temp 99.5 °F (37.5 °C)   Resp 16   Wt 51.7 kg (114 lb)   SpO2 96%   BMI 21.54 kg/m²     Temp (24hrs), Av.7 °F (37.1 °C), Min:97.4 °F (36.3 °C), Max:99.5 °F (37.5 °C)    PHYSICAL EXAM:   General:    Alert, cooperative, young WF in no distress, appears stated age. Head:   Normocephalic, without obvious abnormality, atraumatic. Eyes:   Conjunctivae clear, anicteric sclerae. Pupils are equal  Nose:  Nares normal. No drainage or sinus tenderness. Throat:    Lips, mucosa, and tongue normal.  No Thrush  Neck:  Supple, symmetrical,  no adenopathy, thyroid: non tender  Back:    Symmetric,  No CVA tenderness. Lungs:   CTA bilaterally. No wheezing/rhonchi/rales. Heart:   Regular rate and rhythm,  no murmur, rub or gallop. Abdomen:   Soft, non-tender. Not distended. Bowel sounds normal. No masses. No hepatosplenomegaly. No shifting dullness. Extremities: No cyanosis. No edema. No clubbing  Skin:     Texture, turgor normal. No rashes/lesions/jaundice  Lymph:  Cervical, supraclavicular normal.  Psych:  Good insight. Not depressed. Not anxious or agitated. Neurologic: EOMs intact. No facial asymmetry.  No aphasia or slurred speech normal strength, A/O X 3. LAB DATA REVIEWED:    Lab Results   Component Value Date/Time    WBC 10.8 05/12/2019 04:54 AM    HGB 12.1 05/12/2019 04:54 AM    HCT 35.5 05/12/2019 04:54 AM    PLATELET 100 32/19/7197 04:54 AM    MCV 99.2 (H) 05/12/2019 04:54 AM     Lab Results   Component Value Date/Time    ALT (SGPT) 15 05/12/2019 04:54 AM    AST (SGOT) 15 05/12/2019 04:54 AM    Alk. phosphatase 42 (L) 05/12/2019 04:54 AM    Bilirubin, total 1.6 (H) 05/12/2019 04:54 AM     Lab Results   Component Value Date/Time    Sodium 141 05/12/2019 04:54 AM    Potassium 3.5 05/12/2019 04:54 AM    Chloride 109 (H) 05/12/2019 04:54 AM    CO2 22 05/12/2019 04:54 AM    Anion gap 10 05/12/2019 04:54 AM    Glucose 65 05/12/2019 04:54 AM    BUN 11 05/12/2019 04:54 AM    Creatinine 0.56 05/12/2019 04:54 AM    BUN/Creatinine ratio 20 05/12/2019 04:54 AM    GFR est AA >60 05/12/2019 04:54 AM    GFR est non-AA >60 05/12/2019 04:54 AM    Calcium 8.1 (L) 05/12/2019 04:54 AM     Lab Results   Component Value Date/Time    Lipase 46 (L) 05/11/2019 09:39 AM     Impression:    Intractable nausea and vomiting    Epigastric pain    Unintentional weight loss     Major depressive disorder   Plan:  Patient frustrated with her ongoing sxs of AM nausea with vomiting that will last for hours and is acidic vomitus. Has some reflux changes on her EGD last year and started smoking cigarettes a year ago and some marijuana as well. Dietary indiscretions that can precipitate reflux certainly could be playing a role. However cyclical vomiting or rumination syndrome possible as well. HIDA neg for cholecystitis but CCK not given to r/o biliary dyskinesia. I would still r/o Gastroparesis with GES. In addition given the 40 lb wt loss, FH of Crohn's in 2 second degree relatives and some diarrhea she should at some point have a colonoscopy when she is able to tolerate PO intake better to be prepped.     -stool studies, celiac abs, IBD panel sent  -check gastric emptying scan in am  -will give PPI BID and sucralfate QID  -antispasmodics  -consider repeat U/S to r/o GB mark  -Dr. Kandace Britt to resume care in am and see if eventual colonoscopy indicated     ________________________________________________  Care Plan discussed with:    [x]    Patient   [x]    Family   []    Nursing   []    Attending  _____________________________  GI: Pb White MD

## 2019-05-12 NOTE — PROGRESS NOTES
Oncology End of Shift Note Bedside shift change report given to Karyle Patient, RN (incoming nurse) by Anil Noland (outgoing nurse) on [facility]. Report included the following information SBAR, Kardex, MAR and Recent Results. Shift Summary: No acute changes; no c/o of nausea or vomiting throughout shift; labs drawn Issues for Physician to Address:   
 
Patient on Cardiac Monitoring? [] Yes 
[] No 
 
Rhythm:   
 
 
 
Shift Events Anil Noland

## 2019-05-12 NOTE — PROGRESS NOTES
Oncology End of Shift Note Bedside shift change report given to Ledora Bernheim, RN (incoming nurse) by Amelia Alexander (outgoing nurse) on [facility]. Report included the following information SBAR, Kardex, ED Summary, Intake/Output, MAR and Accordion. Shift Summary: No significant changes, small BM, family at the bedside Issues for Physician to Address: Mom wants patient to get a colonoscopy while she is in the hospital; also wants update/plan from GI Patient on Cardiac Monitoring? [] Yes 
[x] No 
 
Rhythm:   
 
 
 
Shift Events Amelia Alexander

## 2019-05-12 NOTE — PROGRESS NOTES
Hospitalist Progress Note NAME: Chel Sofia :  2000 MRN:  [id number] Assessment / Plan: 
Epigastric pain with Intractable nausea, vomiting & diarrhea: F/U work up as ordered, due for US abdomen, CT shows no gallstones but tanner portal edema, HIDA is negative, GI help appreciated. NM emptying scan tomorrow, will need Colonoscopy Esophagitis: c/w PPI Cannabis Emesis Sd: counseled on quitting, supportive treatment, 
Weight Loss: 2ry to above, work up as ordered. Leukocytosis: was reactive so far resolved. Major depressive disorder Continue antidepressants Surrogate Decision Maker: Mother Baseline: functional 
Code status: Full Prophylaxis: SCD's Recommended Disposition: Home w/Family Subjective: Chief Complaint / Reason for Physician Visit \"I've been Nauseous for 1 year\". Discussed with RN events overnight. Review of Systems: 
Symptom Y/N Comments  Symptom Y/N Comments Fever/Chills    Chest Pain Poor Appetite    Edema Cough    Abdominal Pain Sputum    Joint Pain SOB/LEO    Pruritis/Rash Nausea/vomit y   Tolerating PT/OT Diarrhea    Tolerating Diet Constipation    Other Could NOT obtain due to:   
 
Objective: VITALS:  
Last 24hrs VS reviewed since prior progress note. Most recent are: 
Patient Vitals for the past 24 hrs: 
 Temp Pulse Resp BP SpO2  
19 0742 98.1 °F (36.7 °C) 100 16 96/44 97 % 19 0507    111/47   
19 2249 99.5 °F (37.5 °C) 87 16 (!) 89/41 96 % 19 1835 99.2 °F (37.3 °C) 75 14 111/59 98 % 19 1628 98.7 °F (37.1 °C) 72 17 102/57 100 % 19 1500  69 16 106/42 99 % 19 1302  70 18 106/49 96 % 19 1100  70 17 113/62 100 % 19 1033  70 16 106/68   
19 1030  82 16 106/68 98 % 19 1000 97.4 °F (36.3 °C) 71 17 (!) 114/91 100 % 19 0925  77 18 116/71 99 % Intake/Output Summary (Last 24 hours) at 2019 9619 Last data filed at 5/12/2019 3831 Gross per 24 hour Intake 2911.66 ml Output  Net 2911.66 ml PHYSICAL EXAM: 
General: WD, WN. Alert, cooperative, no acute distress   
EENT:  EOMI. Anicteric sclerae. MMM Resp:  CTA bilaterally, no wheezing or rales. No accessory muscle use CV:  Regular  rhythm,  No edema GI:  Soft, Non distended, Non tender.  +Bowel sounds Neurologic:  Alert and oriented X 3, normal speech, Psych:   Good insight. Not anxious nor agitated Skin:  No rashes. No jaundice Reviewed most current lab test results and cultures  YES Reviewed most current radiology test results   YES Review and summation of old records today    NO Reviewed patient's current orders and MAR    YES 
PMH/SH reviewed - no change compared to H&P 
________________________________________________________________________ Care Plan discussed with: 
  Comments Patient y Family  y mother RN y   
Care Manager Consultant  y Dr. Zoe Gonzalez Multidiciplinary team rounds were held today with , nursing, pharmacist and clinical coordinator. Patient's plan of care was discussed; medications were reviewed and discharge planning was addressed. ________________________________________________________________________ Total NON critical care TIME:  35   Minutes Total CRITICAL CARE TIME Spent:   Minutes non procedure based Comments >50% of visit spent in counseling and coordination of care y   
________________________________________________________________________ Festus Anderson MD  
 
Procedures: see electronic medical records for all procedures/Xrays and details which were not copied into this note but were reviewed prior to creation of Plan. LABS: 
I reviewed today's most current labs and imaging studies. Pertinent labs include: 
Recent Labs 05/12/19 
[phone] 05/11/19 
3382 WBC 10.8 18.2*  18.2* HGB 12.1 14.9  14.9 HCT 35.5 43.7  43.7  317  315 Recent Labs 05/12/19 
[phone] 05/11/19 
3866  140  
K 3.5 4.0  
* 104 CO2 22 23 GLU 65 157* BUN 11 14 CREA 0.56 0.80 CA 8.1* 9.3 ALB 3.2* 4.4 TBILI 1.6* 1.1*  
SGOT 15 19 ALT 15 22 Signed: Jewel Washburn MD

## 2019-05-13 ENCOUNTER — APPOINTMENT (OUTPATIENT)
Dept: NUCLEAR MEDICINE | Age: 19
DRG: 392 | End: 2019-05-13
Attending: SPECIALIST
Payer: COMMERCIAL

## 2019-05-13 ENCOUNTER — ANESTHESIA EVENT (OUTPATIENT)
Dept: ENDOSCOPY | Age: 19
DRG: 392 | End: 2019-05-13
Payer: COMMERCIAL

## 2019-05-13 LAB
ALBUMIN SERPL-MCNC: 3.2 G/DL (ref 3.5–5)
ALBUMIN/GLOB SERPL: 1.3 {RATIO} (ref 1.1–2.2)
ALP SERPL-CCNC: 39 U/L (ref 45–117)
ALT SERPL-CCNC: 14 U/L (ref 12–78)
ANION GAP SERPL CALC-SCNC: 8 MMOL/L (ref 5–15)
AST SERPL-CCNC: 16 U/L (ref 15–37)
BAKER'S YEAST IGA QN: <20 UNITS (ref 0–24.9)
BAKER'S YEAST IGG QN: <20 UNITS (ref 0–24.9)
BASOPHILS # BLD: 0.1 K/UL (ref 0–0.1)
BASOPHILS NFR BLD: 1 % (ref 0–1)
BILIRUB SERPL-MCNC: 1.9 MG/DL (ref 0.2–1)
BUN SERPL-MCNC: 4 MG/DL (ref 6–20)
BUN/CREAT SERPL: 7 (ref 12–20)
CALCIUM SERPL-MCNC: 8.1 MG/DL (ref 8.5–10.1)
CHLORIDE SERPL-SCNC: 111 MMOL/L (ref 97–108)
CO2 SERPL-SCNC: 23 MMOL/L (ref 21–32)
CREAT SERPL-MCNC: 0.55 MG/DL (ref 0.55–1.02)
DIFFERENTIAL METHOD BLD: ABNORMAL
EOSINOPHIL # BLD: 0.4 K/UL (ref 0–0.4)
EOSINOPHIL NFR BLD: 5 % (ref 0–7)
ERYTHROCYTE [DISTWIDTH] IN BLOOD BY AUTOMATED COUNT: 12.3 % (ref 11.5–14.5)
GLIADIN PEPTIDE IGA SER-ACNC: 3 UNITS (ref 0–19)
GLIADIN PEPTIDE IGG SER-ACNC: 3 UNITS (ref 0–19)
GLOBULIN SER CALC-MCNC: 2.4 G/DL (ref 2–4)
GLUCOSE SERPL-MCNC: 73 MG/DL (ref 65–100)
HCT VFR BLD AUTO: 34.9 % (ref 35–47)
HGB BLD-MCNC: 11.8 G/DL (ref 11.5–16)
IGA SERPL-MCNC: 117 MG/DL (ref 87–352)
IMM GRANULOCYTES # BLD AUTO: 0 K/UL (ref 0–0.04)
IMM GRANULOCYTES NFR BLD AUTO: 0 % (ref 0–0.5)
LYMPHOCYTES # BLD: 3 K/UL (ref 0.8–3.5)
LYMPHOCYTES NFR BLD: 40 % (ref 12–49)
MAGNESIUM SERPL-MCNC: 1.7 MG/DL (ref 1.6–2.4)
MCH RBC QN AUTO: 33.5 PG (ref 26–34)
MCHC RBC AUTO-ENTMCNC: 33.8 G/DL (ref 30–36.5)
MCV RBC AUTO: 99.1 FL (ref 80–99)
MONOCYTES # BLD: 0.6 K/UL (ref 0–1)
MONOCYTES NFR BLD: 8 % (ref 5–13)
NEUTS SEG # BLD: 3.5 K/UL (ref 1.8–8)
NEUTS SEG NFR BLD: 46 % (ref 32–75)
NRBC # BLD: 0 K/UL (ref 0–0.01)
NRBC BLD-RTO: 0 PER 100 WBC
P-ANCA ATYPICAL TITR SER IF: NORMAL TITER
PLATELET # BLD AUTO: 229 K/UL (ref 150–400)
PMV BLD AUTO: 10.7 FL (ref 8.9–12.9)
POTASSIUM SERPL-SCNC: 3.3 MMOL/L (ref 3.5–5.1)
PROT SERPL-MCNC: 5.6 G/DL (ref 6.4–8.2)
RBC # BLD AUTO: 3.52 M/UL (ref 3.8–5.2)
SODIUM SERPL-SCNC: 142 MMOL/L (ref 136–145)
TTG IGA SER-ACNC: <2 U/ML (ref 0–3)
TTG IGG SER-ACNC: 3 U/ML (ref 0–5)
WBC # BLD AUTO: 7.5 K/UL (ref 3.6–11)

## 2019-05-13 PROCEDURE — 74011250636 HC RX REV CODE- 250/636: Performed by: INTERNAL MEDICINE

## 2019-05-13 PROCEDURE — 85025 COMPLETE CBC W/AUTO DIFF WBC: CPT

## 2019-05-13 PROCEDURE — 80053 COMPREHEN METABOLIC PANEL: CPT

## 2019-05-13 PROCEDURE — 83735 ASSAY OF MAGNESIUM: CPT

## 2019-05-13 PROCEDURE — A9541 TC99M SULFUR COLLOID: HCPCS

## 2019-05-13 PROCEDURE — 74011000250 HC RX REV CODE- 250: Performed by: INTERNAL MEDICINE

## 2019-05-13 PROCEDURE — 65270000015 HC RM PRIVATE ONCOLOGY

## 2019-05-13 PROCEDURE — 74011250637 HC RX REV CODE- 250/637: Performed by: INTERNAL MEDICINE

## 2019-05-13 PROCEDURE — C9113 INJ PANTOPRAZOLE SODIUM, VIA: HCPCS | Performed by: INTERNAL MEDICINE

## 2019-05-13 PROCEDURE — 36415 COLL VENOUS BLD VENIPUNCTURE: CPT

## 2019-05-13 PROCEDURE — 74011250637 HC RX REV CODE- 250/637: Performed by: SPECIALIST

## 2019-05-13 RX ORDER — POTASSIUM CHLORIDE AND SODIUM CHLORIDE 900; 300 MG/100ML; MG/100ML
INJECTION, SOLUTION INTRAVENOUS CONTINUOUS
Status: DISCONTINUED | OUTPATIENT
Start: 2019-05-13 | End: 2019-05-14 | Stop reason: HOSPADM

## 2019-05-13 RX ORDER — POLYETHYLENE GLYCOL 3350, SODIUM SULFATE ANHYDROUS, SODIUM BICARBONATE, SODIUM CHLORIDE, POTASSIUM CHLORIDE 236; 22.74; 6.74; 5.86; 2.97 G/4L; G/4L; G/4L; G/4L; G/4L
4000 POWDER, FOR SOLUTION ORAL ONCE
Status: COMPLETED | OUTPATIENT
Start: 2019-05-13 | End: 2019-05-13

## 2019-05-13 RX ORDER — LORAZEPAM 0.5 MG/1
0.5 TABLET ORAL
Status: DISCONTINUED | OUTPATIENT
Start: 2019-05-13 | End: 2019-05-14 | Stop reason: HOSPADM

## 2019-05-13 RX ADMIN — LORAZEPAM 0.5 MG: 0.5 TABLET ORAL at 13:18

## 2019-05-13 RX ADMIN — SUCRALFATE 1 G: 1 TABLET ORAL at 08:37

## 2019-05-13 RX ADMIN — POLYETHYLENE GLYCOL 3350, SODIUM SULFATE ANHYDROUS, SODIUM BICARBONATE, SODIUM CHLORIDE, POTASSIUM CHLORIDE 4000 ML: 236; 22.74; 6.74; 5.86; 2.97 POWDER, FOR SOLUTION ORAL at 17:58

## 2019-05-13 RX ADMIN — SUCRALFATE 1 G: 1 TABLET ORAL at 12:37

## 2019-05-13 RX ADMIN — ESCITALOPRAM OXALATE 10 MG: 10 TABLET ORAL at 17:58

## 2019-05-13 RX ADMIN — SODIUM CHLORIDE 100 ML/HR: 900 INJECTION, SOLUTION INTRAVENOUS at 03:29

## 2019-05-13 RX ADMIN — SUCRALFATE 1 G: 1 TABLET ORAL at 17:58

## 2019-05-13 RX ADMIN — Medication 5 ML: at 22:03

## 2019-05-13 RX ADMIN — POTASSIUM CHLORIDE AND SODIUM CHLORIDE: 900; 300 INJECTION, SOLUTION INTRAVENOUS at 23:32

## 2019-05-13 RX ADMIN — LORAZEPAM 0.5 MG: 0.5 TABLET ORAL at 23:02

## 2019-05-13 RX ADMIN — POTASSIUM CHLORIDE AND SODIUM CHLORIDE: 900; 300 INJECTION, SOLUTION INTRAVENOUS at 08:35

## 2019-05-13 RX ADMIN — ONDANSETRON 4 MG: 2 INJECTION INTRAMUSCULAR; INTRAVENOUS at 19:39

## 2019-05-13 RX ADMIN — ALPRAZOLAM 0.5 MG: 0.5 TABLET ORAL at 09:52

## 2019-05-13 RX ADMIN — SODIUM CHLORIDE 40 MG: 9 INJECTION, SOLUTION INTRAMUSCULAR; INTRAVENOUS; SUBCUTANEOUS at 08:37

## 2019-05-13 RX ADMIN — SODIUM CHLORIDE 40 MG: 9 INJECTION, SOLUTION INTRAMUSCULAR; INTRAVENOUS; SUBCUTANEOUS at 20:52

## 2019-05-13 NOTE — PROGRESS NOTES
Hospitalist Progress Note NAME: Dottie Castrejon :  2000 MRN:  [id number] Assessment / Plan: 
Epigastric pain with Intractable nausea, vomiting & diarrhea: F/U work up as ordered,  CT shows no gallstones but tanner portal edema, HIDA is negative, US shows no dilation on bile ducts but thick wall in gallbladder, GI help appreciated. NM emptying scan today, will need elective Colonoscopy, will ask for Surgery input on gallbladder Esophagitis: c/w PPI and Sucralfate Cannabis Emesis Sd: counseled on quitting, supportive treatment, 
Weight Loss: 2ry to above, work up as ordered. Leukocytosis: was reactive so far resolved. Hypokalemia: replace and monitor Moderate Malnutrition: BMI is OK, but albumin is slightly low, counseled, monitor. Major depressive disorder Continue antidepressants Surrogate Decision Maker: Mother Baseline: functional 
Code status: Full Prophylaxis: SCD's Recommended Disposition: Home w/Family Subjective: Chief Complaint / Reason for Physician Visit \"I feel OK\". Discussed with RN events overnight. Review of Systems: 
Symptom Y/N Comments  Symptom Y/N Comments Fever/Chills    Chest Pain Poor Appetite    Edema Cough    Abdominal Pain Sputum    Joint Pain SOB/LEO    Pruritis/Rash Nausea/vomit y   Tolerating PT/OT Diarrhea    Tolerating Diet Constipation    Other Could NOT obtain due to:   
 
Objective: VITALS:  
Last 24hrs VS reviewed since prior progress note. Most recent are: 
Patient Vitals for the past 24 hrs: 
 Temp Pulse Resp BP SpO2  
19 0757 98.6 °F (37 °C) 61 16 112/58 99 % 19 2248 97.9 °F (36.6 °C) 90 16 105/50 100 % 19 1957 98.9 °F (37.2 °C) 65 16 113/67 99 % 19 1543 98.7 °F (37.1 °C) 67 16 109/62 97 % No intake or output data in the 24 hours ending 19 0844 PHYSICAL EXAM: 
General: WD, WN. Alert, cooperative, no acute distress   
EENT:  EOMI. Anicteric sclerae. MMM Resp: CTA bilaterally, no wheezing or rales. No accessory muscle use CV:  Regular  rhythm,  No edema GI:  Soft, Non distended, Non tender.  +Bowel sounds Neurologic:  Alert and oriented X 3, normal speech, Psych:   Good insight. Not anxious nor agitated Skin:  No rashes. No jaundice Reviewed most current lab test results and cultures  YES Reviewed most current radiology test results   YES Review and summation of old records today    NO Reviewed patient's current orders and MAR    YES 
PMH/SH reviewed - no change compared to H&P 
________________________________________________________________________ Care Plan discussed with: 
  Comments Patient y Family  y mother RN y   
Care Manager Consultant  y Dr. Sundar Malone Multidiciplinary team rounds were held today with , nursing, pharmacist and clinical coordinator. Patient's plan of care was discussed; medications were reviewed and discharge planning was addressed. ________________________________________________________________________ Total NON critical care TIME:  35   Minutes Total CRITICAL CARE TIME Spent:   Minutes non procedure based Comments >50% of visit spent in counseling and coordination of care y   
________________________________________________________________________ Sharyle Saunders, MD  
 
Procedures: see electronic medical records for all procedures/Xrays and details which were not copied into this note but were reviewed prior to creation of Plan. LABS: 
I reviewed today's most current labs and imaging studies. Pertinent labs include: 
Recent Labs 05/13/19 
6944 05/12/19 
2216 05/11/19 
1575 WBC 7.5 10.8 18.2*  18.2* HGB 11.8 12.1 14.9  14.9 HCT 34.9* 35.5 43.7  43.7  259 317  315 Recent Labs 05/13/19 
4890 05/12/19 
1880 05/11/19 
2756  141 140  
K 3.3* 3.5 4.0  
* 109* 104 CO2 23 22 23 GLU 73 65 157* BUN 4* 11 14 CREA 0.55 0.56 0.80 CA 8.1* 8.1* 9.3 MG 1.7  --   --   
ALB 3.2* 3.2* 4.4 TBILI 1.9* 1.6* 1.1*  
SGOT 16 15 19 ALT 14 15 22 Signed: Kelly Mazariegos MD

## 2019-05-13 NOTE — ROUTINE PROCESS
Bedside shift change report given to [address] (oncoming nurse) by Gabriella Lutz (offgoing nurse). Report included the following information SBAR, Kardex, ED Summary, Procedure Summary, Intake/Output, MAR and Recent Results.

## 2019-05-13 NOTE — PROGRESS NOTES
Reason for Admission:   Intractable Nausea & vomitting RRAT Score:    7 Plan for utilizing home health:  No prior HH. Pt is independent w/ADL's, and requires no assistance. Pt drives self to all medical appointments. No DME. Current Advanced Directive/Advance Care Plan: FULL Code. No Advanced Medical Directive. Pt is currently working on obtaining POA. Decision maker is her mother Jaime Miller [phone]. Likelihood of Readmission:  Low Transition of Care Plan:    Pt recently established care w/PCP (Dr. Eddi Keita) x 2 weeks ago. PCP appt on 5/15/2019. VENITA Plan: 
1) Pt to follow up w/PCP on 5/15/2019. SW to continue to assist. 
 
Care Management Interventions PCP Verified by CM: Yes(PCP is Dr. Eddi Keita. Established care approximately x 2 weeks ago.  ) Mode of Transport at Discharge: Other (see comment)(Mom to transport home.  ) Transition of Care Consult (CM Consult): Discharge Planning Discharge Durable Medical Equipment: (No O2 or DME.  ) Current Support Network: Relative's Home(Lives w/father in a two story home w/four steps to enter the front door. ) Confirm Follow Up Transport: Family Plan discussed with Pt/Family/Caregiver: Yes Discharge Location Discharge Placement: (Home) Nafisa Diane, MSW 
[phone]

## 2019-05-13 NOTE — ROUTINE PROCESS
1255: Pt tearful intermittently throughout the morning. Patient and patients mother requesting additional anxiety medication (pt given PRN xanas this am). Dr. Nicola Nichols notified; MD to place orders for ativan and pysch consult 1315: Patient/Patients mother refusing psych consult at this time, states \"do not even call it\"

## 2019-05-13 NOTE — PROGRESS NOTES
Initial Nutrition Assessment: 
 
INTERVENTIONS/RECOMMENDATIONS:  
· Diet per GI 
· Nutrition supplements once diet advances · Please document % meals consumed in flowsheet once able to take PO 
 
ASSESSMENT:  
Chart reviewed, medically noted for Intractable nausea and vomiting, Epigastric pain, Unintentional weight loss and PMH shown below. Nutrition referral triggered due to MST score. Pt reports ~40 lbs (29%) weight loss over the past year due to complication above. Due to nausea and vomiting she usually does not eat breakfast and if she does its just a granola bar. Lunch consists of snack (granola bar, gold fish crackers, fruit cup). She does consume a traditional size meal for dinner. PO intake does not exacerbate symptoms. She is having a gastric emptying study today. Pt meets ASPEN criteria for severe chronic malnutrition. Meets Criteria for Chronic Malnutrition  
[x] Severe Malnutrition, as evidenced by: 
 [] Severe muscle wasting, loss of subcutaneous fat 
 [x] Nutritional intake of <75% of recommended intake for >1 month 
 [x] Weight loss of  >5% in 1 month, >7.5% in 3 months, >10% in 6 months, >20% in 1 year 
 [] Severe edema Past Medical History:  
Diagnosis Date  Major depressive disorder 4/25/2019  Psychiatric disorder 2019 Anxiety Diet Order: NPO 
% Eaten:  No data found. Pertinent Medications: [x]Reviewed: NS w/ KCl, PPI, Pertinent Labs: [x]Reviewed: K+ 3.3, Food Allergies: [x]NKFA  []Other Last BM: 5/13 Edema:  n/a      []RUE   []LUE   []RLE   []LLE Pressure Injury:   n/a   [] Stage I   [] Stage II   [] Stage III   [] Stage IV Wt Readings from Last 30 Encounters:  
05/11/19 51.7 kg (114 lb) (22 %, Z= -0.77)*  
11/16/18 55.5 kg (122 lb 4 oz) (40 %, Z= -0.25)*  
01/19/17 73.2 kg (91 %, Z= 1.34)*  
01/04/17 72.6 kg (90 %, Z= 1.31)*  
01/04/17 72.6 kg (90 %, Z= 1.30)*  
06/06/13 69 kg (93 %, Z= 1.51)* * Growth percentiles are based on CDC (Girls, 2-20 Years) data. Anthropometrics:  
Height:   Weight: 51.7 kg (114 lb) IBW (%IBW):   ( ) UBW (%UBW):   (  %) Last Weight Metrics: 
Weight Loss Metrics 5/11/2019 11/16/2018 1/19/2017 1/4/2017 1/4/2017 6/6/2013 Today's Wt 114 lb 122 lb 4 oz 161 lb 6.4 oz 160 lb 0.9 oz 160 lb 152 lb 3.2 oz  
BMI 21.54 kg/m2 23.88 kg/m2 31.15 kg/m2 - - 29.69 kg/m2 Some encounter information is confidential and restricted. Go to Review Flowsheets activity to see all data. BMI: Body mass index is 21.54 kg/m². This BMI is indicative of: 
 []Underweight    [x]Normal    []Overweight    [] Obesity   [] Extreme Obesity (BMI>40) Estimated Nutrition Needs (Based on):  
1600 Kcals/day(BMR: 1225 x 1.3) , 50 g(1 g/kg) Protein Carbohydrate: At Least 130 g/day  Fluids: 1600 mL/day (1ml/kcal) or per primary team 
 
NUTRITION DIAGNOSES:  
Problem:  Unintended weight loss Etiology: related to nausea, vomiting and abdominal pain Signs/Symptoms: as evidenced by pt reports ~40 lbs weight loss x 1 year NUTRITION INTERVENTIONS: 
Meals/Snacks: General/healthful diet   Supplements: Commercial supplement GOAL:  
consume >50% of meals in 2-4 days LEARNING NEEDS (Diet, Food/Nutrient-Drug Interaction):  
 [x] None Identified 
 [] Identified and Education Provided/Documented 
 [] Identified and Pt declined/was not appropriate Cultureal, Taoism, OR Ethnic Dietary Needs:  
 [x] None Identified 
 [] Identified and Addressed 
 
 [x] Interdisciplinary Care Plan Reviewed/Documented  
 [x] Discharge Planning:   TBD MONITORING /EVALUATION:  
  
Food/Nutrient Intake Outcomes: Total energy intake Physical Signs/Symptoms Outcomes: Weight/weight change, Electrolyte and renal profile, Glucose profile, GI, GI profile NUTRITION RISK:  
 [x] High              [] Moderate           []  Low  []  Minimal/Uncompromised PT SEEN FOR:  
 []  MD Consult: []Calorie Count []Diabetic Diet Education []Diet Education []Electrolyte Management []General Nutrition Management and Supplements []Management of Tube Feeding []TPN Recommendations [x]  RN Referral:  [x]MST score >=2 
   []Enteral/Parenteral Nutrition PTA []Pregnant: Gestational DM or Multigestation 
   []Pressure Ulcer/Wound Care needs 
     
[]  Low BMI 
[]  LOS Referral  
 
 
Niurka Patino RDN Pager [phone] Weekend Pager [phone]

## 2019-05-13 NOTE — H&P (VIEW-ONLY)
.Surgery Consult  Consulted by: Dr. Karel Dakins. Thank you. PCP: Graeme Sheehan MD    History and data reviewed. Pt interviewed/examined. Abd non-tender on exam.      Impression:  Chronic n/v.    Periportal edema -- possibly related to aggressive rehydration at admission. No elevation of transaminases. Bilirubin mildly elevated. Plan: Will check HIDA with CCK to look for reproduction of symptoms with CCK which would indicate biliary dyskinesia. Colonoscopy to look for evidence of crohns tomorrow with Dr. Candelario Dakin. FULL NOTE ADDENDUM WILL BE ADDED BELOW. Thanks. Signed By: Windy Rodrigez MD     May 13, 2019      ------------------------------------------------------------------------      ADDENDUM:     Patient interviewed and examined. Subjective:      Ana Jenkins is a 22 y.o. female who has been having problems with n/v and abd pain for some time. Comes in this admission very dehydrated due to vomiting. That's now better but still has pain. Imaging has shown:  CT: Periportal edema which can be seen with vague uterus hydration. Clinical  correlation is needed. US: Gallbladder wall thickening and periportal edema in the absence of  cholelithiasis. HIDA: No evidence of acute cholecystitis or common bile duct obstruction. Mildly delayed gallbladder emptying following CCK administration may indicate  chronic cystic duct dysfunction/chronic cholecystitis. EF 26%. Dr. Candelario Dakin has been following her. No EGD this admission. Patient smokes marijuana regularly. Uses alcohol as well. Objective:   Blood pressure 112/59, pulse 74, temperature 98.1 °F (36.7 °C), resp. rate 15, weight 51.7 kg (114 lb), SpO2 100 %. No data recorded.       Physical Exam:  PHYSICAL EXAM:  Gen:  [x]     A&O     [x]      No acute distress    [x]     non-toxic     []     ill apearing     []     Critical        HEENT:   [x]     anicteric    []      scleral icterus    [x]     moist mucosa     []     dry mucosa    RESP:   [x]     CTA bilaterally, no wheezing/rhonchi/rales/crackles    []     wheezing     []     rhonchi     []     crackles     []     use of accessory muscles    CARD:  [x]     regular rate and rhythm     [x]     No murmurs/rubs/gallops    []     irregular rhythm     []     Murmur     []     Rubs     []     Gallops    ABD:     [x]     soft  [x]     non distended  []     non tender  [x]      NABS    Minimally TTP in the epigastrium only.       SKIN:   [x]     normal      []     Rashes      []     Ulcers     EXT:  [x]      No CCE     []      2+ pulses throughout    []      Clubbing     []     Cyanosis     []     Edema     []     diminished pulses    NEUR:   [x]     Strength normal     []weakness  []LUE    []RUE    []LLE    []RLE    [x]     follows commands          PSYCH:   insight []poor   [x]good                      []     depressed     []     anxious     []     agitated    Past Medical History:   Diagnosis Date    Major depressive disorder 4/25/2019    Psychiatric disorder 2019    Anxiety     Past Surgical History:   Procedure Laterality Date    COLONOSCOPY N/A 5/14/2019    COLONOSCOPY [facility]ireza Germain [facility] ENDOSCOPY    COLONOSCOPY,DIAGNOSTIC  5/14/2019         HX HEENT      wisdom teeth    UPPER GI ENDOSCOPY,BIOPSY  11/16/2018           Family History   Problem Relation Age of Onset    Other Mother         divericulitis, gallbladder removal    No Known Problems Father     Crohn's Disease Maternal Grandmother 48    Other Maternal Grandmother         divericulitis and gallbladder removal    Stroke Maternal Grandfather     Hypertension Maternal Grandfather     High Cholesterol Maternal Grandfather     Cataract Maternal Grandfather     Stroke Paternal Grandmother     Cancer Paternal Grandmother 75        kidney, breast---age 61    No Known Problems Paternal Grandfather      Social History     Socioeconomic History    Marital status: SINGLE     Spouse name: Not on file    Number of children: Not on file    Years of education: Not on file    Highest education level: Not on file   Tobacco Use    Smoking status: Current Some Day Smoker     Packs/day: 0.00     Years: 0.00     Pack years: 0.00    Smokeless tobacco: Never Used    Tobacco comment: Per pt, \"occasionally\"   Substance and Sexual Activity    Alcohol use: Yes     Comment: Per pt, \"rarely\"    Drug use: Yes     Frequency: 7.0 times per week     Types: Marijuana    Sexual activity: Yes     Partners: Male     Birth control/protection: IUD      Prior to Admission medications    Medication Sig Start Date End Date Taking? Authorizing Provider   pantoprazole (PROTONIX) 40 mg tablet Take 1 Tab by mouth daily. 5/14/19  Yes Ashish Anderson DO   metoclopramide HCl (REGLAN) 5 mg tablet Take 1 Tab by mouth Before breakfast, lunch, dinner and at bedtime for 10 days. 5/14/19 5/24/19 Yes Ashish Anderson DO   escitalopram oxalate (LEXAPRO) 10 mg tablet Take 1 Tab by mouth daily. Indications: major depressive disorder 4/26/19  Yes Alberto Nguyen MD   meclizine (ANTIVERT) 25 mg tablet Take 1 Tab by mouth three (3) times daily as needed for Dizziness. 5/15/19   Ameena Briseno MD   ondansetron (ZOFRAN ODT) 4 mg disintegrating tablet Take 1 Tab by mouth every eight (8) hours as needed for Nausea. 5/15/19   Ameena Briseno MD   potassium chloride SR (KLOR-CON 10) 10 mEq tablet Take 1 Tab by mouth daily. 5/15/19   Ameena Briseno MD   levonorgestrel (MIRENA) 20 mcg/24 hr (5 years) IUD 1 Each by IntraUTERine route once.     Provider, Historical     ALLERGIES:  No Known Allergies    Review of Systems:  (unchecked were asked but negative)          []      Fever/chills     [x]      Abd Pain   []      Fatique                                   [x]      Nausea/Vomit   []      Weight loss    []      Diarrhea []      Constipation    []      Headache    []      Blood in stool  []      Visual loss    []      Hematuria   []      Hearing loss    [] Dysuria      []      Heat intolerance    []      Myalgias  []      Cold intolerance    []      Arthralgias  []      Reflux     []      Neuropathy   []      Dysphagia    []      Easy bruising  []      Chest Pain    []      Prolonged bleeding   []      Palpitations    []      Anxiety   []      Cough                                                    []      Depression                                []      Sputum           []      SOB/LEO                                   []     Unable to obtain  ROS due to  []     mental status change  []     sedated   []     intubated    LABS:  No results for input(s): WBC, HGB, HCT, PLT, HGBEXT, HCTEXT, PLTEXT in the last 72 hours. No results for input(s): NA, K, CL, CO2, BUN, CREA, GLU, CA, MG, PHOS, URICA in the last 72 hours. No results for input(s): SGOT, GPT, AP, TBIL, TP, ALB, GLOB, GGT, AML, LPSE in the last 72 hours. No lab exists for component: AMYP, HLPSE  No results for input(s): INR, PTP, APTT in the last 72 hours.     No lab exists for component: INREXT      Signed By: Aakash Malave MD     May 21, 2019

## 2019-05-13 NOTE — CONSULTS
.Surgery Consult  Consulted by: Dr. Essence Sim. Thank you. PCP: Brian Suárez MD    History and data reviewed. Pt interviewed/examined. Abd non-tender on exam.      Impression:  Chronic n/v.    Periportal edema -- possibly related to aggressive rehydration at admission. No elevation of transaminases. Bilirubin mildly elevated. Plan: Will check HIDA with CCK to look for reproduction of symptoms with CCK which would indicate biliary dyskinesia. Colonoscopy to look for evidence of crohns tomorrow with Dr. Amari Espinoza. FULL NOTE ADDENDUM WILL BE ADDED BELOW. Thanks. Signed By: Yumi De La Vega MD     May 13, 2019      ------------------------------------------------------------------------      ADDENDUM:     Patient interviewed and examined. Subjective:      Elizabeth Ruvalcaba is a 22 y.o. female who has been having problems with n/v and abd pain for some time. Comes in this admission very dehydrated due to vomiting. That's now better but still has pain. Imaging has shown:  CT: Periportal edema which can be seen with vague uterus hydration. Clinical  correlation is needed. US: Gallbladder wall thickening and periportal edema in the absence of  cholelithiasis. HIDA: No evidence of acute cholecystitis or common bile duct obstruction. Mildly delayed gallbladder emptying following CCK administration may indicate  chronic cystic duct dysfunction/chronic cholecystitis. EF 26%. Dr. Amari Espinoza has been following her. No EGD this admission. Patient smokes marijuana regularly. Uses alcohol as well. Objective:   Blood pressure 112/59, pulse 74, temperature 98.1 °F (36.7 °C), resp. rate 15, weight 51.7 kg (114 lb), SpO2 100 %. No data recorded.       Physical Exam:  PHYSICAL EXAM:  Gen:  [x]     A&O     [x]      No acute distress    [x]     non-toxic     []     ill apearing     []     Critical        HEENT:   [x]     anicteric    []      scleral icterus    [x]     moist mucosa     []     dry mucosa    RESP:   [x]     CTA bilaterally, no wheezing/rhonchi/rales/crackles    []     wheezing     []     rhonchi     []     crackles     []     use of accessory muscles    CARD:  [x]     regular rate and rhythm     [x]     No murmurs/rubs/gallops    []     irregular rhythm     []     Murmur     []     Rubs     []     Gallops    ABD:     [x]     soft  [x]     non distended  []     non tender  [x]      NABS    Minimally TTP in the epigastrium only.       SKIN:   [x]     normal      []     Rashes      []     Ulcers     EXT:  [x]      No CCE     []      2+ pulses throughout    []      Clubbing     []     Cyanosis     []     Edema     []     diminished pulses    NEUR:   [x]     Strength normal     []weakness  []LUE    []RUE    []LLE    []RLE    [x]     follows commands          PSYCH:   insight []poor   [x]good                      []     depressed     []     anxious     []     agitated    Past Medical History:   Diagnosis Date    Major depressive disorder 4/25/2019    Psychiatric disorder 2019    Anxiety     Past Surgical History:   Procedure Laterality Date    COLONOSCOPY N/A 5/14/2019    COLONOSCOPY performed by Blas Bravo MD at [facility] ENDOSCOPY    COLONOSCOPY,DIAGNOSTIC  5/14/2019         HX HEENT      wisdom teeth    UPPER GI ENDOSCOPY,BIOPSY  11/16/2018           Family History   Problem Relation Age of Onset    Other Mother         divericulitis, gallbladder removal    No Known Problems Father     Crohn's Disease Maternal Grandmother 48    Other Maternal Grandmother         divericulitis and gallbladder removal    Stroke Maternal Grandfather     Hypertension Maternal Grandfather     High Cholesterol Maternal Grandfather     Cataract Maternal Grandfather     Stroke Paternal Grandmother     Cancer Paternal Grandmother 75        kidney, breast---age 61    No Known Problems Paternal Grandfather      Social History     Socioeconomic History    Marital status: SINGLE     Spouse name: Not on file    Number of children: Not on file    Years of education: Not on file    Highest education level: Not on file   Tobacco Use    Smoking status: Current Some Day Smoker     Packs/day: 0.00     Years: 0.00     Pack years: 0.00    Smokeless tobacco: Never Used    Tobacco comment: Per pt, \"occasionally\"   Substance and Sexual Activity    Alcohol use: Yes     Comment: Per pt, \"rarely\"    Drug use: Yes     Frequency: 7.0 times per week     Types: Marijuana    Sexual activity: Yes     Partners: Male     Birth control/protection: IUD      Prior to Admission medications    Medication Sig Start Date End Date Taking? Authorizing Provider   pantoprazole (PROTONIX) 40 mg tablet Take 1 Tab by mouth daily. 5/14/19  Yes Ashish Anderson DO   metoclopramide HCl (REGLAN) 5 mg tablet Take 1 Tab by mouth Before breakfast, lunch, dinner and at bedtime for 10 days. 5/14/19 5/24/19 Yes Ashish Anderson DO   escitalopram oxalate (LEXAPRO) 10 mg tablet Take 1 Tab by mouth daily. Indications: major depressive disorder 4/26/19  Yes Jerman Arteaga MD   meclizine (ANTIVERT) 25 mg tablet Take 1 Tab by mouth three (3) times daily as needed for Dizziness. 5/15/19   Blinda Ahumada, MD   ondansetron (ZOFRAN ODT) 4 mg disintegrating tablet Take 1 Tab by mouth every eight (8) hours as needed for Nausea. 5/15/19   Blinda Ahumada, MD   potassium chloride SR (KLOR-CON 10) 10 mEq tablet Take 1 Tab by mouth daily. 5/15/19   Blinda Ahumada, MD   levonorgestrel (MIRENA) 20 mcg/24 hr (5 years) IUD 1 Each by IntraUTERine route once.     Provider, Historical     ALLERGIES:  No Known Allergies    Review of Systems:  (unchecked were asked but negative)          []      Fever/chills     [x]      Abd Pain   []      Fatique                                   [x]      Nausea/Vomit   []      Weight loss    []      Diarrhea []      Constipation    []      Headache    []      Blood in stool  []      Visual loss    []      Hematuria   []      Hearing loss    [] Dysuria      []      Heat intolerance    []      Myalgias  []      Cold intolerance    []      Arthralgias  []      Reflux     []      Neuropathy   []      Dysphagia    []      Easy bruising  []      Chest Pain    []      Prolonged bleeding   []      Palpitations    []      Anxiety   []      Cough                                                    []      Depression                                []      Sputum           []      SOB/LEO                                   []     Unable to obtain  ROS due to  []     mental status change  []     sedated   []     intubated    LABS:  No results for input(s): WBC, HGB, HCT, PLT, HGBEXT, HCTEXT, PLTEXT in the last 72 hours. No results for input(s): NA, K, CL, CO2, BUN, CREA, GLU, CA, MG, PHOS, URICA in the last 72 hours. No results for input(s): SGOT, GPT, AP, TBIL, TP, ALB, GLOB, GGT, AML, LPSE in the last 72 hours. No lab exists for component: AMYP, HLPSE  No results for input(s): INR, PTP, APTT in the last 72 hours.     No lab exists for component: INREXT      Signed By: Master Collier MD     May 21, 2019

## 2019-05-13 NOTE — PROGRESS NOTES
GI Progress Note Aamir Puja NAME:Funmilayo Valles :2000 SYB:[id number] ATTG: Wing Watson MD  PCP: Demetrice Michelle MD 
Date/Time:  2019 12:53 PM  
Assessment:  
· N/V 
· Generalized abd discomfort- note that CRP is negative Plan: · Check final GES results · Will pursue colonoscopy tomorrow given noted generalized abd pain and fam hx Crohn's dz · Consider for HIDA with CCK to see if reproduces her sx of N/V, pain- prior study was done without CCK · If testing negative, consider trial of LD metoclopramide QID · Check pending Fecal calprotectin, fecal elastase, AC · Nereds to stop THC use given potential association with N/V. Subjective:  
Discussed with RN events overnight. Seen during and after GES study. No emesis noted. Anxious about needing to stay in hospital. 
 
Complaint Y/N Description Abdominal Pain y Hematemesis n Hematochezia n Melena n   
Constipation n   
Diarrhea n Dyspepsia n Dysphagia n   
Jaundiced n   
Nausea/vomiting n Review of Systems: 
Symptom Y/N Comments  Symptom Y/N Comments Fever/Chills n   Chest Pain n   
Cough n   Headaches n Sputum n   Joint Pain n   
SOB/LEO n   Pruritis/Rash n Tolerating Diet  NPO  Other Could NOT obtain due to:   
 
Objective: VITALS:  
Last 24hrs VS reviewed since prior progress note. Most recent are: 
Visit Vitals /58 (BP 1 Location: Right arm, BP Patient Position: Sitting) Pulse 61 Temp 98.6 °F (37 °C) Resp 16 Wt 51.7 kg (114 lb) SpO2 99% BMI 21.54 kg/m² Intake/Output Summary (Last 24 hours) at 2019 1253 Last data filed at 2019 0848 Gross per 24 hour Intake 11.67 ml Output  Net 11.67 ml PHYSICAL EXAM: 
General: WD, WN. Alert, cooperative, no acute distress   
HEENT: NC, Atraumatic. PERRL. Anicteric sclerae. Lungs:  CTA Bilaterally. No Wheezing/Rhonchi/Rales. Heart:  Regular  rhythm,  No murmur/Rub/Gallops Abdomen: Soft, Non distended, Non tender.  +BS Extremities: No c/c/e Neurologic:  CN 2-12 gi, A/O X 3. No acute neurological distress Psych:   Good insight. Not anxious nor agitated. Lab and Radiology Data Reviewed: (see below) Medications Reviewed: (see below) PMH/SH reviewed - no change compared to H&P 
________________________________________________________________________ Total time spent with patient: 15 minutes ________________________________________________________________________ Care Plan discussed with: 
Patient y Family  y  
RN y  
           Consultant:  garrett Vallejo MD  
 
Procedures: see electronic medical records for all procedures/Xrays and details which were not copied into this note but were reviewed prior to creation of Plan. LABS: 
Recent Labs 05/13/19 
0038 05/12/19 
2343 WBC 7.5 10.8 HGB 11.8 12.1 HCT 34.9* 35.5  259 Recent Labs 05/13/19 
8149 05/12/19 
9035 05/11/19 
5211  141 140  
K 3.3* 3.5 4.0  
* 109* 104 CO2 23 22 23 BUN 4* 11 14 CREA 0.55 0.56 0.80 GLU 73 65 157* CA 8.1* 8.1* 9.3 MG 1.7  --   --   
 
Recent Labs 05/13/19 
9894 05/12/19 
9493 05/11/19 
1936 SGOT 16 15 19 AP 39* 42* 57  
TP 5.6* 5.7* 7.8 ALB 3.2* 3.2* 4.4  
GLOB 2.4 2.5 3.4 LPSE  --   --  46* No results for input(s): INR, PTP, APTT in the last 72 hours. No lab exists for component: INREXT No results for input(s): FE, TIBC, PSAT, FERR in the last 72 hours. No results found for: FOL, RBCF No results for input(s): PH, PCO2, PO2 in the last 72 hours. No results for input(s): CPK, CKMB in the last 72 hours. No lab exists for component: TROPONINI Lab Results Component Value Date/Time  Color YELLOW/STRAW 05/11/2019 11:03 AM  
 Appearance CLEAR 05/11/2019 11:03 AM  
 Specific gravity 1.020 05/11/2019 11:03 AM  
 pH (UA) 7.0 05/11/2019 11:03 AM  
 Protein NEGATIVE  05/11/2019 11:03 AM  
 Glucose NEGATIVE  05/11/2019 11:03 AM  
 Ketone 80 (A) 05/11/2019 11:03 AM  
 Bilirubin NEGATIVE  05/11/2019 11:03 AM  
 Urobilinogen 0.2 05/11/2019 11:03 AM  
 Nitrites NEGATIVE  05/11/2019 11:03 AM  
 Leukocyte Esterase NEGATIVE  05/11/2019 11:03 AM  
 Epithelial cells MANY (A) 04/25/2019 12:35 PM  
 Bacteria 1+ (A) 04/25/2019 12:35 PM  
 WBC 5-10 04/25/2019 12:35 PM  
 RBC 5-10 04/25/2019 12:35 PM  
 
 
MEDICATIONS: 
Current Facility-Administered Medications Medication Dose Route Frequency  0.9% sodium chloride with KCl 40 mEq/L infusion   IntraVENous CONTINUOUS  
 PEG 3350-Electrolytes (GO-LYTELY) SUSPENSION 4,000 mL  4,000 mL Oral ONCE  
 sucralfate (CARAFATE) tablet 1 g  1 g Oral TIDAC  sodium chloride (NS) flush 5-40 mL  5-40 mL IntraVENous Q8H  
 sodium chloride (NS) flush 5-40 mL  5-40 mL IntraVENous PRN  
 acetaminophen (TYLENOL) tablet 650 mg  650 mg Oral Q6H PRN  
 ondansetron (ZOFRAN) injection 4 mg  4 mg IntraVENous Q4H PRN  
 fentaNYL citrate (PF) injection 12.5-25 mcg  12.5-25 mcg IntraVENous Q2H PRN  pantoprazole (PROTONIX) 40 mg in sodium chloride 0.9% 10 mL injection  40 mg IntraVENous Q12H  ALPRAZolam (XANAX) tablet 0.5 mg  0.5 mg Oral Q12H PRN  
 escitalopram oxalate (LEXAPRO) tablet 10 mg  10 mg Oral QPM

## 2019-05-13 NOTE — PROGRESS NOTES
Problem: Risk for Spread of Infection Goal: Prevent transmission of infectious organism to others Description Prevent the transmission of infectious organisms to other patients, staff members, and visitors. Outcome: Progressing Towards Goal 
  
Problem: Patient Education:  Go to Education Activity Goal: Patient/Family Education Outcome: Progressing Towards Goal 
  
Problem: Falls - Risk of 
Goal: *Absence of Falls Description Document Iris Solis Fall Risk and appropriate interventions in the flowsheet. Outcome: Progressing Towards Goal 
  
Problem: Patient Education: Go to Patient Education Activity Goal: Patient/Family Education Outcome: Progressing Towards Goal

## 2019-05-13 NOTE — PROGRESS NOTES
Oncology End of Shift Note Bedside shift change report given to Elkin Arnett RN (incoming nurse) by Katerine Davis (outgoing nurse) on [facility]. Report included the following information SBAR, Kardex and MAR. Shift Summary: pt stable over night. No nausea/voimiting. NPO since midnight. Issues for Physician to Address:   
 
Patient on Cardiac Monitoring? NO 
[] Yes 
[x] No 
  
 
 
 
Shift Events Katerine Davis

## 2019-05-13 NOTE — CDMP QUERY
Dr. Kamaljit Perez, Patient admitted with nausea/vomiting, noted to have 40 lb weight loss x 1 year. If possible, please document in progress notes and d/c summary if you are evaluating and/or treating any of the following: 
 
=> Chronic Moderate Protein-Calorie Malnutrition 
=> Chronic Severe Protein-Calorie Malnutrition =>Other Explanation of clinical findings =>Clinically Undetermined (no explanation for clinical findings) The medical record reflects the following: 
   Risk Factors: 22 WF w/hx: anxiety/depression Clinical Indicators: Nausea/vomiting x 1 year with poor PO intake due to symptoms, per dietician PN \"Meets criteria for chronic malnutrition: Severe malnutrition\", Pt with 40 lb (29%) weight loss over past year with nutritional intake <75% of recommended intake for >1 month Treatment: pending gastric emptying study, nutritional supplements, dietician consult Please clarify and document your clinical opinion in the progress notes and discharge summary including the definitive and/or presumptive diagnosis, (suspected or probable), related to the above clinical findings. Please include clinical findings supporting your diagnosis. Thank you, [email or web address] 
[phone]

## 2019-05-13 NOTE — INTERDISCIPLINARY ROUNDS
Oncology Interdisciplinary rounds were held today to discuss patient plan of care and outcomes. The following members were present: Nursing, Physician, Case Management, Pharmacy, and PT/OT Actual Length of Stay: 2 DRG GLOS: 2.6 Expected Length of Stay: 2d 14h Plan            Discharge SCD's Waiting on HIDA Scan Home with family? ?

## 2019-05-14 ENCOUNTER — ANESTHESIA (OUTPATIENT)
Dept: ENDOSCOPY | Age: 19
DRG: 392 | End: 2019-05-14
Payer: COMMERCIAL

## 2019-05-14 ENCOUNTER — APPOINTMENT (OUTPATIENT)
Dept: NUCLEAR MEDICINE | Age: 19
DRG: 392 | End: 2019-05-14
Attending: INTERNAL MEDICINE
Payer: COMMERCIAL

## 2019-05-14 VITALS
SYSTOLIC BLOOD PRESSURE: 112 MMHG | OXYGEN SATURATION: 100 % | BODY MASS INDEX: 21.54 KG/M2 | TEMPERATURE: 98.1 F | DIASTOLIC BLOOD PRESSURE: 59 MMHG | HEART RATE: 74 BPM | RESPIRATION RATE: 15 BRPM | WEIGHT: 114 LBS

## 2019-05-14 LAB
ALBUMIN SERPL-MCNC: 3.4 G/DL (ref 3.5–5)
ALBUMIN/GLOB SERPL: 1.4 {RATIO} (ref 1.1–2.2)
ALP SERPL-CCNC: 42 U/L (ref 45–117)
ALT SERPL-CCNC: 19 U/L (ref 12–78)
ANA SER QL: NEGATIVE
ANION GAP SERPL CALC-SCNC: 6 MMOL/L (ref 5–15)
AST SERPL-CCNC: 18 U/L (ref 15–37)
BASOPHILS # BLD: 0.1 K/UL (ref 0–0.1)
BASOPHILS NFR BLD: 1 % (ref 0–1)
BILIRUB DIRECT SERPL-MCNC: 0.2 MG/DL (ref 0–0.2)
BILIRUB INDIRECT SERPL-MCNC: 2 MG/DL (ref 0–1.1)
BILIRUB SERPL-MCNC: 2.1 MG/DL (ref 0.2–1)
BILIRUB SERPL-MCNC: 2.2 MG/DL (ref 0.2–1)
BUN SERPL-MCNC: 4 MG/DL (ref 6–20)
BUN/CREAT SERPL: 7 (ref 12–20)
CALCIUM SERPL-MCNC: 8.6 MG/DL (ref 8.5–10.1)
CALPROTECTIN STL-MCNT: <16 UG/G (ref 0–120)
CHLORIDE SERPL-SCNC: 109 MMOL/L (ref 97–108)
CO2 SERPL-SCNC: 25 MMOL/L (ref 21–32)
CREAT SERPL-MCNC: 0.54 MG/DL (ref 0.55–1.02)
DIFFERENTIAL METHOD BLD: ABNORMAL
EOSINOPHIL # BLD: 0.5 K/UL (ref 0–0.4)
EOSINOPHIL NFR BLD: 6 % (ref 0–7)
ERYTHROCYTE [DISTWIDTH] IN BLOOD BY AUTOMATED COUNT: 12.1 % (ref 11.5–14.5)
GLOBULIN SER CALC-MCNC: 2.5 G/DL (ref 2–4)
GLUCOSE SERPL-MCNC: 66 MG/DL (ref 65–100)
HCT VFR BLD AUTO: 36.6 % (ref 35–47)
HGB BLD-MCNC: 12.5 G/DL (ref 11.5–16)
IMM GRANULOCYTES # BLD AUTO: 0 K/UL (ref 0–0.04)
IMM GRANULOCYTES NFR BLD AUTO: 0 % (ref 0–0.5)
LYMPHOCYTES # BLD: 2.9 K/UL (ref 0.8–3.5)
LYMPHOCYTES NFR BLD: 34 % (ref 12–49)
MAGNESIUM SERPL-MCNC: 1.8 MG/DL (ref 1.6–2.4)
MCH RBC QN AUTO: 33.2 PG (ref 26–34)
MCHC RBC AUTO-ENTMCNC: 34.2 G/DL (ref 30–36.5)
MCV RBC AUTO: 97.1 FL (ref 80–99)
MONOCYTES # BLD: 0.7 K/UL (ref 0–1)
MONOCYTES NFR BLD: 8 % (ref 5–13)
NEUTS SEG # BLD: 4.3 K/UL (ref 1.8–8)
NEUTS SEG NFR BLD: 51 % (ref 32–75)
NRBC # BLD: 0 K/UL (ref 0–0.01)
NRBC BLD-RTO: 0 PER 100 WBC
PLATELET # BLD AUTO: 229 K/UL (ref 150–400)
PMV BLD AUTO: 10.8 FL (ref 8.9–12.9)
POTASSIUM SERPL-SCNC: 3.9 MMOL/L (ref 3.5–5.1)
PROT SERPL-MCNC: 5.9 G/DL (ref 6.4–8.2)
RBC # BLD AUTO: 3.77 M/UL (ref 3.8–5.2)
SODIUM SERPL-SCNC: 140 MMOL/L (ref 136–145)
WBC # BLD AUTO: 8.5 K/UL (ref 3.6–11)

## 2019-05-14 PROCEDURE — C9113 INJ PANTOPRAZOLE SODIUM, VIA: HCPCS | Performed by: INTERNAL MEDICINE

## 2019-05-14 PROCEDURE — 76060000031 HC ANESTHESIA FIRST 0.5 HR: Performed by: INTERNAL MEDICINE

## 2019-05-14 PROCEDURE — 36415 COLL VENOUS BLD VENIPUNCTURE: CPT

## 2019-05-14 PROCEDURE — 0DJD8ZZ INSPECTION OF LOWER INTESTINAL TRACT, VIA NATURAL OR ARTIFICIAL OPENING ENDOSCOPIC: ICD-10-PCS | Performed by: INTERNAL MEDICINE

## 2019-05-14 PROCEDURE — 78227 HEPATOBIL SYST IMAGE W/DRUG: CPT

## 2019-05-14 PROCEDURE — 82248 BILIRUBIN DIRECT: CPT

## 2019-05-14 PROCEDURE — 85025 COMPLETE CBC W/AUTO DIFF WBC: CPT

## 2019-05-14 PROCEDURE — 83735 ASSAY OF MAGNESIUM: CPT

## 2019-05-14 PROCEDURE — 80053 COMPREHEN METABOLIC PANEL: CPT

## 2019-05-14 PROCEDURE — 74011250636 HC RX REV CODE- 250/636: Performed by: INTERNAL MEDICINE

## 2019-05-14 PROCEDURE — 74011250636 HC RX REV CODE- 250/636

## 2019-05-14 PROCEDURE — 74011250637 HC RX REV CODE- 250/637: Performed by: SPECIALIST

## 2019-05-14 PROCEDURE — 74011250636 HC RX REV CODE- 250/636: Performed by: NEUROMUSCULOSKELETAL MEDICINE & OMM

## 2019-05-14 PROCEDURE — 74011000250 HC RX REV CODE- 250: Performed by: INTERNAL MEDICINE

## 2019-05-14 PROCEDURE — 76040000019: Performed by: INTERNAL MEDICINE

## 2019-05-14 RX ORDER — MIDAZOLAM HYDROCHLORIDE 1 MG/ML
.25-5 INJECTION, SOLUTION INTRAMUSCULAR; INTRAVENOUS
Status: DISCONTINUED | OUTPATIENT
Start: 2019-05-14 | End: 2019-05-14 | Stop reason: HOSPADM

## 2019-05-14 RX ORDER — SODIUM CHLORIDE 0.9 % (FLUSH) 0.9 %
5-40 SYRINGE (ML) INJECTION EVERY 8 HOURS
Status: DISCONTINUED | OUTPATIENT
Start: 2019-05-14 | End: 2019-05-14 | Stop reason: HOSPADM

## 2019-05-14 RX ORDER — EPINEPHRINE 0.1 MG/ML
1 INJECTION INTRACARDIAC; INTRAVENOUS
Status: DISCONTINUED | OUTPATIENT
Start: 2019-05-14 | End: 2019-05-14 | Stop reason: HOSPADM

## 2019-05-14 RX ORDER — SODIUM CHLORIDE 9 MG/ML
75 INJECTION, SOLUTION INTRAVENOUS CONTINUOUS
Status: DISCONTINUED | OUTPATIENT
Start: 2019-05-14 | End: 2019-05-14 | Stop reason: HOSPADM

## 2019-05-14 RX ORDER — SODIUM CHLORIDE 9 MG/ML
INJECTION, SOLUTION INTRAVENOUS
Status: DISCONTINUED | OUTPATIENT
Start: 2019-05-14 | End: 2019-05-14 | Stop reason: HOSPADM

## 2019-05-14 RX ORDER — METOCLOPRAMIDE 10 MG/1
5 TABLET ORAL
Status: DISCONTINUED | OUTPATIENT
Start: 2019-05-14 | End: 2019-05-14 | Stop reason: HOSPADM

## 2019-05-14 RX ORDER — DEXTROMETHORPHAN/PSEUDOEPHED 2.5-7.5/.8
1.2 DROPS ORAL
Status: DISCONTINUED | OUTPATIENT
Start: 2019-05-14 | End: 2019-05-14 | Stop reason: HOSPADM

## 2019-05-14 RX ORDER — NALOXONE HYDROCHLORIDE 0.4 MG/ML
0.4 INJECTION, SOLUTION INTRAMUSCULAR; INTRAVENOUS; SUBCUTANEOUS
Status: DISCONTINUED | OUTPATIENT
Start: 2019-05-14 | End: 2019-05-14 | Stop reason: HOSPADM

## 2019-05-14 RX ORDER — PANTOPRAZOLE SODIUM 40 MG/1
40 TABLET, DELAYED RELEASE ORAL DAILY
Qty: 42 TAB | Refills: 1 | Status: SHIPPED | OUTPATIENT
Start: 2019-05-14

## 2019-05-14 RX ORDER — METOCLOPRAMIDE 5 MG/1
5 TABLET ORAL
Qty: 40 TAB | Refills: 0 | Status: SHIPPED | OUTPATIENT
Start: 2019-05-14 | End: 2019-05-24

## 2019-05-14 RX ORDER — LIDOCAINE HYDROCHLORIDE 20 MG/ML
INJECTION, SOLUTION EPIDURAL; INFILTRATION; INTRACAUDAL; PERINEURAL AS NEEDED
Status: DISCONTINUED | OUTPATIENT
Start: 2019-05-14 | End: 2019-05-14 | Stop reason: HOSPADM

## 2019-05-14 RX ORDER — SODIUM CHLORIDE 0.9 % (FLUSH) 0.9 %
5-40 SYRINGE (ML) INJECTION AS NEEDED
Status: DISCONTINUED | OUTPATIENT
Start: 2019-05-14 | End: 2019-05-14 | Stop reason: HOSPADM

## 2019-05-14 RX ORDER — FLUMAZENIL 0.1 MG/ML
0.2 INJECTION INTRAVENOUS
Status: DISCONTINUED | OUTPATIENT
Start: 2019-05-14 | End: 2019-05-14 | Stop reason: HOSPADM

## 2019-05-14 RX ORDER — PROPOFOL 10 MG/ML
INJECTION, EMULSION INTRAVENOUS AS NEEDED
Status: DISCONTINUED | OUTPATIENT
Start: 2019-05-14 | End: 2019-05-14 | Stop reason: HOSPADM

## 2019-05-14 RX ORDER — ATROPINE SULFATE 0.1 MG/ML
0.5 INJECTION INTRAVENOUS
Status: DISCONTINUED | OUTPATIENT
Start: 2019-05-14 | End: 2019-05-14 | Stop reason: HOSPADM

## 2019-05-14 RX ORDER — FENTANYL CITRATE 50 UG/ML
25 INJECTION, SOLUTION INTRAMUSCULAR; INTRAVENOUS
Status: DISCONTINUED | OUTPATIENT
Start: 2019-05-14 | End: 2019-05-14 | Stop reason: HOSPADM

## 2019-05-14 RX ORDER — METOCLOPRAMIDE 5 MG/1
5 TABLET ORAL
Qty: 40 TAB | Refills: 0 | Status: SHIPPED | OUTPATIENT
Start: 2019-05-14 | End: 2019-05-14

## 2019-05-14 RX ADMIN — LIDOCAINE HYDROCHLORIDE 40 MG: 20 INJECTION, SOLUTION EPIDURAL; INFILTRATION; INTRACAUDAL; PERINEURAL at 10:25

## 2019-05-14 RX ADMIN — SODIUM CHLORIDE 40 MG: 9 INJECTION, SOLUTION INTRAMUSCULAR; INTRAVENOUS; SUBCUTANEOUS at 11:54

## 2019-05-14 RX ADMIN — PROPOFOL 50 MG: 10 INJECTION, EMULSION INTRAVENOUS at 10:28

## 2019-05-14 RX ADMIN — SODIUM CHLORIDE: 9 INJECTION, SOLUTION INTRAVENOUS at 10:19

## 2019-05-14 RX ADMIN — SINCALIDE 1.03 MCG: 5 INJECTION, POWDER, LYOPHILIZED, FOR SOLUTION INTRAVENOUS at 10:30

## 2019-05-14 RX ADMIN — PROPOFOL 50 MG: 10 INJECTION, EMULSION INTRAVENOUS at 10:31

## 2019-05-14 RX ADMIN — PROPOFOL 50 MG: 10 INJECTION, EMULSION INTRAVENOUS at 10:26

## 2019-05-14 RX ADMIN — PROPOFOL 50 MG: 10 INJECTION, EMULSION INTRAVENOUS at 10:25

## 2019-05-14 RX ADMIN — SUCRALFATE 1 G: 1 TABLET ORAL at 11:53

## 2019-05-14 RX ADMIN — SODIUM CHLORIDE 75 ML/HR: 900 INJECTION, SOLUTION INTRAVENOUS at 10:15

## 2019-05-14 RX ADMIN — PROPOFOL 20 MG: 10 INJECTION, EMULSION INTRAVENOUS at 10:35

## 2019-05-14 NOTE — PERIOP NOTES
Pt resting, tolerating liquids, vss.  Denies any pain. Mom at bedside, updated on pt's status. 1110-Pt transported to room 1119, no complaints at time of transfer.

## 2019-05-14 NOTE — PROGRESS NOTES
GI Attg Note: 
Pt d/w  and Simi Lopez, in addition reviewed with mother and pt after procedure. HIDA is abnl, but does not reproduce sx and Elevation of bilirubin is all indirect. The latter, supports insignificant dx of Gilbert's syndrome. Colonoscopy with nl ileum and colon. .  GES was minimally abnl. Plan: 
1) Trial of metoclopramide 5mg 1 tab PO QID, taken QAC and QHS. 2) F/u with me in 2 wks as OP 3) Would avoid cholecystectomy at this time.  
Milady Snowden MD

## 2019-05-14 NOTE — ANESTHESIA PREPROCEDURE EVALUATION
Anesthetic History PONV Review of Systems / Medical History Patient summary reviewed, nursing notes reviewed and pertinent labs reviewed Pulmonary Smoker Neuro/Psych Psychiatric history Comments: Anxiety/major depression Cardiovascular Within defined limits Exercise tolerance: >4 METS 
  
GI/Hepatic/Renal 
Within defined limits Endo/Other Within defined limits Hypothyroidism Other Findings Physical Exam 
 
Airway Mallampati: II 
TM Distance: 4 - 6 cm Neck ROM: normal range of motion Mouth opening: Normal 
 
 Cardiovascular Regular rate and rhythm,  S1 and S2 normal,  no murmur, click, rub, or gallop Dental 
No notable dental hx Pulmonary Breath sounds clear to auscultation Abdominal 
GI exam deferred Other Findings Anesthetic Plan ASA: 2 Anesthesia type: general and total IV anesthesia Induction: Intravenous Anesthetic plan and risks discussed with: Patient and Mother Propofol MAC

## 2019-05-14 NOTE — PROCEDURES
NAME:  Albert Francis   :   2000   MRN:   [id number]     Date/Time:  2019 10:45 AM    Colonoscopy Operative Report    Procedure Type:   Colonoscopy --diagnostic     Indications:     Abdominal pain, generalized  Pre-operative Diagnosis: see indication above  Post-operative Diagnosis:  See findings below  :  Oumar Cummings MD  Referring Provider: Sayda Goncalves MD    Exam:  Airway: clear, no airway problems anticipated  Heart: RRR, without gallops or rubs  Lungs: clear bilaterally without wheezes, crackles, or rhonchi  Abdomen: soft, nontender, nondistended, bowel sounds present  Mental Status: awake, alert and oriented to person, place and time    Sedation:  MAC anesthesia Propofol 220mg IV  Procedure Details:  After informed consent was obtained with all risks and benefits of procedure explained and preoperative exam completed, the patient was taken to the endoscopy suite and placed in the left lateral decubitus position. Upon sequential sedation as per above, a digital rectal exam was performed demonstrating no hemorrhoids. The Olympus videocolonoscope  was inserted in the rectum and carefully advanced to the cecum, which was identified by the ileocecal valve and appendiceal orifice. The distal 10cm, of the terminal ileum was evaluated. The quality of preparation was adequate. The colonoscope was slowly withdrawn with careful evaluation between folds. Retroflexion in the rectum was completed demonstrating no hemorrhoids. Findings:     -Normal terminal ileum  -Normal colonic mucosa without mass, polyp, or inflammation    Specimen Removed:  None. Complications: None. EBL:  None. Impression:    -Normal terminal ileum  -Normal colonic mucosa without mass, polyp, or inflammation    Recommendations: -  -For colon cancer screening in this average-risk patient, colonoscopy may be repeated at age 38 years.   -Regular diet.    -Resume normal medication(s).     -Consider for OP HIDA scan with CCK    Discharge Disposition:  To room after recovery. May safely discharge home today.         Alexx Mckeon MD

## 2019-05-14 NOTE — PERIOP NOTES
Marce Freedman 2000 
[id number] Situation: 
Verbal report given from: MICHELE Alvarez RN Procedure: Procedure(s): 
COLONOSCOPY Background: 
 
Preoperative diagnosis: abd pain Postoperative diagnosis: normal colon exam 
 
:  Dr. Leroy Dick Assistant(s): Endoscopy Technician-1: Sundar Bailey Endoscopy RN-1: Martha Posadas RN Specimens: * No specimens in log * Assessment: 
Intra-procedure medications Propofol 220 mg Anesthesia gave intra-procedure sedation and medications, see anesthesia flow sheet Intravenous fluids: Genetta Mouse Vital signs stable Abdominal assessment: round and soft nontender Recommendation: 
 
Permission to share finding with Mom-Brigida All side rails up, bed in low position, wheels locked. Nurse at bedside.

## 2019-05-14 NOTE — PROGRESS NOTES
Biliary study w/CCK completed @ 10:00am today Please check with DR regarding diet Biliary study w/ CCK - mild cramping

## 2019-05-14 NOTE — ANESTHESIA POSTPROCEDURE EVALUATION
Procedure(s): 
COLONOSCOPY. 
 
total IV anesthesia, MAC Anesthesia Post Evaluation Patient location during evaluation: PACU Note status: Adequate. Level of consciousness: responsive to verbal stimuli and sleepy but conscious Pain management: satisfactory to patient Airway patency: patent Anesthetic complications: no 
Cardiovascular status: acceptable Respiratory status: acceptable Hydration status: acceptable Comments: +Post-Anesthesia Evaluation and Assessment Patient: Ana Jenkins MRN: [id number]  SSN: [id number] YOB: 2000  Age: 22 y.o. Sex: female Cardiovascular Function/Vital Signs /55   Pulse 68   Temp 36.7 °C (98.1 °F)   Resp (!) 34   Wt 51.7 kg (114 lb)   SpO2 98%   BMI 21.54 kg/m² Patient is status post Procedure(s): 
COLONOSCOPY. Nausea/Vomiting: Controlled. Postoperative hydration reviewed and adequate. Pain: 
Pain Scale 1: Numeric (0 - 10) (05/14/19 1050) Pain Intensity 1: 0 (05/14/19 1050) Managed. Neurological Status: At baseline. Mental Status and Level of Consciousness: Arousable. Pulmonary Status:  
O2 Device: Room air (05/14/19 1050) Adequate oxygenation and airway patent. Complications related to anesthesia: None Post-anesthesia assessment completed. No concerns. Signed By: Libby Sanches MD  
 5/14/2019 Post anesthesia nausea and vomiting:  controlled Vitals Value Taken Time /55 5/14/2019 10:50 AM  
Temp 36.7 °C (98.1 °F) 5/14/2019 10:50 AM  
Pulse 68 5/14/2019 10:50 AM  
Resp 34 5/14/2019 10:50 AM  
SpO2 98 % 5/14/2019 10:50 AM

## 2019-05-14 NOTE — DISCHARGE SUMMARY
Hospitalist Discharge Summary     Patient ID:  Brandon Santoro  [id number]  1 y.o.  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  female who presents with nausea, vomiting, abdominal pain. As per patient, she is been having symptoms on daily basis for past many months. She is been constantly loosing weight for past severe months and reported more then 40lbs and is not been trying to loose weight. Pt reported 8/10 pain in epigastric region, non radiating, sharp in nature, mostly starts when she wakes up. She also reported diarrhea, constant vomiting and can't even tell how many episodes of vomiting she had. Per reported underwent EGD last dec or January, per chart review she also had US done in 2017. Pt reported chills at home but denies fever, chest pain, cough, problems urination. ______________________________________________________________________  DISCHARGE SUMMARY/HOSPITAL COURSE:  for full details see H&P, daily progress notes, labs, consult notes. The pt was admitted to acute inpt medicine for intractable nausea and vomiting with elevated bilirubin. The pt was advised to refrain from thc. The pt underwent a hida with cck with mild dyskinesia. The pt underwent a colonscopy which was negative. The pt underwent a ges which was mildly delayed at 93 minutes.   The pt is planned for dc home with reglan 5mg po qid and fu with gi in 2 weeks.  Elevated bili likley related to gilberts disease.      _______________________________________________________________________  Patient seen and examined by me on discharge day. Pertinent Findings:  Gen:    Not in distress, no juandice or icterus  Chest: Clear lungs  CVS:   Regular rhythm. No edema  Abd:  Soft, not distended, not tender  Neuro:  Alert, nad  _______________________________________________________________________  DISCHARGE MEDICATIONS:   Current Discharge Medication List      START taking these medications    Details   metoclopramide HCl (REGLAN) 5 mg tablet Take 1 Tab by mouth Before breakfast, lunch, dinner and at bedtime for 10 days. Qty: 40 Tab, Refills: 0         CONTINUE these medications which have NOT CHANGED    Details   escitalopram oxalate (LEXAPRO) 10 mg tablet Take 1 Tab by mouth daily. Indications: major depressive disorder  Qty: 30 Tab, Refills: 0      levonorgestrel (MIRENA) 20 mcg/24 hr (5 years) IUD 1 Each by IntraUTERine route once. Patient Follow Up Instructions: Activity: Activity as tolerated  Diet: Regular Diet  Wound Care: None needed    Follow-up with gi in 2 weeks.   Follow-up tests/labs none  Follow-up Information     Follow up With Specialties Details Why Contact Info    Zara Mancilla MD Gastroenterology In 2 weeks elevated bili [address]  [address]  [phone]          ________________________________________________________________    Risk of deterioration: Low    Condition at Discharge:  Stable  __________________________________________________________________    Disposition  Home with family, no needs    ____________________________________________________________________    Code Status: Full Code  ___________________________________________________________________      Total time in minutes spent coordinating this discharge (includes going over instructions, follow-up, prescriptions, and preparing report for sign off to her PCP) :  30 minutes    Signed:  Sima Andrade, DO

## 2019-05-14 NOTE — PROGRESS NOTES
Patient educated on discharge instructions, medications, and follow up appointments. Patient has PCP follow up scheduled for tomorrow per her mother. PIV removed prior to discharge. Opportunity given for questions and clarification. Patient discharge with all belongings via wheelchair to mothers personal vehicle.

## 2019-05-14 NOTE — PERIOP NOTES
Anesthesia reports 220mg Propofol, 40mg Lidocaine and 200mL NS given during procedure. Received report from anesthesia staff on vital signs and status of patient.

## 2019-05-14 NOTE — PROGRESS NOTES
Problem: Risk for Spread of Infection Goal: Prevent transmission of infectious organism to others Description Prevent the transmission of infectious organisms to other patients, staff members, and visitors. Outcome: Progressing Towards Goal 
  
Problem: Patient Education:  Go to Education Activity Goal: Patient/Family Education Outcome: Progressing Towards Goal 
  
Problem: Falls - Risk of 
Goal: *Absence of Falls Description Document Lilibeth Erickson Fall Risk and appropriate interventions in the flowsheet. Outcome: Progressing Towards Goal 
  
Problem: Patient Education: Go to Patient Education Activity Goal: Patient/Family Education Outcome: Progressing Towards Goal 
  
Problem: Pain Goal: *Control of Pain Outcome: Progressing Towards Goal 
Goal: *PALLIATIVE CARE:  Alleviation of Pain Outcome: Progressing Towards Goal 
  
Problem: Patient Education: Go to Patient Education Activity Goal: Patient/Family Education Outcome: Progressing Towards Goal

## 2019-05-14 NOTE — PROGRESS NOTES
Problem: Risk for Spread of Infection Goal: Prevent transmission of infectious organism to others Description Prevent the transmission of infectious organisms to other patients, staff members, and visitors. Outcome: Resolved/Met Problem: Patient Education:  Go to Education Activity Goal: Patient/Family Education Outcome: Resolved/Met Problem: Falls - Risk of 
Goal: *Absence of Falls Description Document Eugenio Parada Fall Risk and appropriate interventions in the flowsheet. Outcome: Resolved/Met Problem: Patient Education: Go to Patient Education Activity Goal: Patient/Family Education Outcome: Resolved/Met Problem: Pain Goal: *Control of Pain Outcome: Resolved/Met Goal: *PALLIATIVE CARE:  Alleviation of Pain Outcome: Resolved/Met Problem: Patient Education: Go to Patient Education Activity Goal: Patient/Family Education Outcome: Resolved/Met

## 2019-05-15 ENCOUNTER — HOSPITAL ENCOUNTER (EMERGENCY)
Age: 19
Discharge: HOME | End: 2019-05-15
Attending: EMERGENCY MEDICINE
Payer: COMMERCIAL

## 2019-05-15 ENCOUNTER — PATIENT OUTREACH (OUTPATIENT)
Dept: OTHER | Age: 19
End: 2019-05-15

## 2019-05-15 VITALS
HEIGHT: 60 IN | BODY MASS INDEX: 21.77 KG/M2 | HEART RATE: 82 BPM | TEMPERATURE: 98.4 F | WEIGHT: 110.89 LBS | OXYGEN SATURATION: 99 % | RESPIRATION RATE: 16 BRPM | SYSTOLIC BLOOD PRESSURE: 103 MMHG | DIASTOLIC BLOOD PRESSURE: 64 MMHG

## 2019-05-15 DIAGNOSIS — R10.11 ABDOMINAL PAIN, RIGHT UPPER QUADRANT: ICD-10-CM

## 2019-05-15 DIAGNOSIS — E86.0 DEHYDRATION: ICD-10-CM

## 2019-05-15 DIAGNOSIS — H93.19 TINNITUS, UNSPECIFIED LATERALITY: ICD-10-CM

## 2019-05-15 DIAGNOSIS — R10.12 ABDOMINAL PAIN, LUQ (LEFT UPPER QUADRANT): ICD-10-CM

## 2019-05-15 DIAGNOSIS — R55 SYNCOPE AND COLLAPSE: Primary | ICD-10-CM

## 2019-05-15 LAB
ALBUMIN SERPL-MCNC: 4.4 G/DL (ref 3.5–5)
ALBUMIN/GLOB SERPL: 1.3 {RATIO} (ref 1.1–2.2)
ALP SERPL-CCNC: 54 U/L (ref 45–117)
ALPHA-GAL IGE QN: <0.1 KU/L
ALT SERPL-CCNC: 22 U/L (ref 12–78)
ANION GAP SERPL CALC-SCNC: 7 MMOL/L (ref 5–15)
APPEARANCE UR: CLEAR
AST SERPL-CCNC: 16 U/L (ref 15–37)
BACTERIA URNS QL MICRO: NEGATIVE /HPF
BASOPHILS # BLD: 0.1 K/UL (ref 0–0.1)
BASOPHILS NFR BLD: 1 % (ref 0–1)
BEEF (BOS SPP) IGE: <0.1 KU/L
BILIRUB SERPL-MCNC: 1.7 MG/DL (ref 0.2–1)
BILIRUB UR QL: NEGATIVE
BUN SERPL-MCNC: 7 MG/DL (ref 6–20)
BUN/CREAT SERPL: 9 (ref 12–20)
CALCIUM SERPL-MCNC: 9.4 MG/DL (ref 8.5–10.1)
CHLORIDE SERPL-SCNC: 103 MMOL/L (ref 97–108)
CLASS INTERPRETATION, 805324: 0
CLASS INTERPRETATION, 805757: 0
CO2 SERPL-SCNC: 30 MMOL/L (ref 21–32)
COLOR UR: ABNORMAL
CREAT SERPL-MCNC: 0.81 MG/DL (ref 0.55–1.02)
DIFFERENTIAL METHOD BLD: ABNORMAL
ELASTASE PANC STL-MCNT: >500 UG ELAST./G
EOSINOPHIL # BLD: 0.6 K/UL (ref 0–0.4)
EOSINOPHIL NFR BLD: 5 % (ref 0–7)
EPITH CASTS URNS QL MICRO: ABNORMAL /LPF
ERYTHROCYTE [DISTWIDTH] IN BLOOD BY AUTOMATED COUNT: 12.1 % (ref 11.5–14.5)
GLOBULIN SER CALC-MCNC: 3.3 G/DL (ref 2–4)
GLUCOSE SERPL-MCNC: 93 MG/DL (ref 65–100)
GLUCOSE UR STRIP.AUTO-MCNC: NEGATIVE MG/DL
HCT VFR BLD AUTO: 44.1 % (ref 35–47)
HGB BLD-MCNC: 15.1 G/DL (ref 11.5–16)
HGB UR QL STRIP: ABNORMAL
IMM GRANULOCYTES # BLD AUTO: 0 K/UL (ref 0–0.04)
IMM GRANULOCYTES NFR BLD AUTO: 0 % (ref 0–0.5)
KETONES UR QL STRIP.AUTO: ABNORMAL MG/DL
LAMB/MUTTON (OVIS SPP) IGE: <0.1 KU/L
LEUKOCYTE ESTERASE UR QL STRIP.AUTO: ABNORMAL
LIPASE SERPL-CCNC: 59 U/L (ref 73–393)
LYMPHOCYTES # BLD: 2.7 K/UL (ref 0.8–3.5)
LYMPHOCYTES NFR BLD: 25 % (ref 12–49)
Lab: 0
MAGNESIUM SERPL-MCNC: 1.9 MG/DL (ref 1.6–2.4)
MCH RBC QN AUTO: 33.4 PG (ref 26–34)
MCHC RBC AUTO-ENTMCNC: 34.2 G/DL (ref 30–36.5)
MCV RBC AUTO: 97.6 FL (ref 80–99)
MONOCYTES # BLD: 0.7 K/UL (ref 0–1)
MONOCYTES NFR BLD: 7 % (ref 5–13)
NEUTS SEG # BLD: 6.7 K/UL (ref 1.8–8)
NEUTS SEG NFR BLD: 62 % (ref 32–75)
NITRITE UR QL STRIP.AUTO: NEGATIVE
NRBC # BLD: 0 K/UL (ref 0–0.01)
NRBC BLD-RTO: 0 PER 100 WBC
PH UR STRIP: 7 [PH] (ref 5–8)
PLATELET # BLD AUTO: 300 K/UL (ref 150–400)
PMV BLD AUTO: 10.6 FL (ref 8.9–12.9)
PORK (SUS SPP) IGE: <0.1 KU/L
POTASSIUM SERPL-SCNC: 3.3 MMOL/L (ref 3.5–5.1)
PROT SERPL-MCNC: 7.7 G/DL (ref 6.4–8.2)
PROT UR STRIP-MCNC: NEGATIVE MG/DL
RBC # BLD AUTO: 4.52 M/UL (ref 3.8–5.2)
RBC #/AREA URNS HPF: ABNORMAL /HPF (ref 0–5)
SODIUM SERPL-SCNC: 140 MMOL/L (ref 136–145)
SP GR UR REFRACTOMETRY: 1.01 (ref 1–1.03)
UROBILINOGEN UR QL STRIP.AUTO: 1 EU/DL (ref 0.2–1)
WBC # BLD AUTO: 10.9 K/UL (ref 3.6–11)
WBC URNS QL MICRO: ABNORMAL /HPF (ref 0–4)

## 2019-05-15 PROCEDURE — 74011000250 HC RX REV CODE- 250: Performed by: EMERGENCY MEDICINE

## 2019-05-15 PROCEDURE — 74011250636 HC RX REV CODE- 250/636: Performed by: EMERGENCY MEDICINE

## 2019-05-15 PROCEDURE — 85025 COMPLETE CBC W/AUTO DIFF WBC: CPT

## 2019-05-15 PROCEDURE — 36415 COLL VENOUS BLD VENIPUNCTURE: CPT

## 2019-05-15 PROCEDURE — 83735 ASSAY OF MAGNESIUM: CPT

## 2019-05-15 PROCEDURE — 96361 HYDRATE IV INFUSION ADD-ON: CPT

## 2019-05-15 PROCEDURE — 96374 THER/PROPH/DIAG INJ IV PUSH: CPT

## 2019-05-15 PROCEDURE — 96375 TX/PRO/DX INJ NEW DRUG ADDON: CPT

## 2019-05-15 PROCEDURE — 99284 EMERGENCY DEPT VISIT MOD MDM: CPT

## 2019-05-15 PROCEDURE — 83690 ASSAY OF LIPASE: CPT

## 2019-05-15 PROCEDURE — 81001 URINALYSIS AUTO W/SCOPE: CPT

## 2019-05-15 PROCEDURE — 80053 COMPREHEN METABOLIC PANEL: CPT

## 2019-05-15 PROCEDURE — 93005 ELECTROCARDIOGRAM TRACING: CPT

## 2019-05-15 RX ORDER — ONDANSETRON 2 MG/ML
4 INJECTION INTRAMUSCULAR; INTRAVENOUS
Status: COMPLETED | OUTPATIENT
Start: 2019-05-15 | End: 2019-05-15

## 2019-05-15 RX ORDER — FENTANYL CITRATE 50 UG/ML
25 INJECTION, SOLUTION INTRAMUSCULAR; INTRAVENOUS
Status: COMPLETED | OUTPATIENT
Start: 2019-05-15 | End: 2019-05-15

## 2019-05-15 RX ORDER — SODIUM CHLORIDE 0.9 % (FLUSH) 0.9 %
5-40 SYRINGE (ML) INJECTION EVERY 8 HOURS
Status: DISCONTINUED | OUTPATIENT
Start: 2019-05-15 | End: 2019-05-16 | Stop reason: HOSPADM

## 2019-05-15 RX ORDER — MECLIZINE HYDROCHLORIDE 25 MG/1
25 TABLET ORAL
Qty: 20 TAB | Refills: 0 | Status: SHIPPED | OUTPATIENT
Start: 2019-05-15 | End: 2019-06-17 | Stop reason: ALTCHOICE

## 2019-05-15 RX ORDER — SODIUM CHLORIDE 0.9 % (FLUSH) 0.9 %
5-40 SYRINGE (ML) INJECTION AS NEEDED
Status: DISCONTINUED | OUTPATIENT
Start: 2019-05-15 | End: 2019-05-16 | Stop reason: HOSPADM

## 2019-05-15 RX ORDER — MECLIZINE HCL 12.5 MG 12.5 MG/1
25 TABLET ORAL
Status: COMPLETED | OUTPATIENT
Start: 2019-05-15 | End: 2019-05-15

## 2019-05-15 RX ORDER — POTASSIUM CHLORIDE 750 MG/1
10 TABLET, FILM COATED, EXTENDED RELEASE ORAL DAILY
Qty: 7 TAB | Refills: 0 | Status: SHIPPED | OUTPATIENT
Start: 2019-05-15 | End: 2019-06-28

## 2019-05-15 RX ORDER — ONDANSETRON 4 MG/1
4 TABLET, ORALLY DISINTEGRATING ORAL
Qty: 10 TAB | Refills: 0 | Status: SHIPPED | OUTPATIENT
Start: 2019-05-15 | End: 2019-06-17 | Stop reason: ALTCHOICE

## 2019-05-15 RX ADMIN — Medication 10 ML: at 21:25

## 2019-05-15 RX ADMIN — SODIUM CHLORIDE 1000 ML: 900 INJECTION, SOLUTION INTRAVENOUS at 21:11

## 2019-05-15 RX ADMIN — MECLIZINE 25 MG: 12.5 TABLET ORAL at 21:11

## 2019-05-15 RX ADMIN — FAMOTIDINE 20 MG: 10 INJECTION, SOLUTION INTRAVENOUS at 21:11

## 2019-05-15 RX ADMIN — ONDANSETRON 4 MG: 2 INJECTION INTRAMUSCULAR; INTRAVENOUS at 21:11

## 2019-05-15 RX ADMIN — FENTANYL CITRATE 25 MCG: 50 INJECTION, SOLUTION INTRAMUSCULAR; INTRAVENOUS at 21:24

## 2019-05-15 NOTE — PROGRESS NOTES
Transition Of Care Note Patient discharged from [facility] admitted on 19 and discharged on 19 for Intractable nausea and vomiting. Medical History:    
Past Medical History:  
Diagnosis Date  Major depressive disorder 2019  Psychiatric disorder 2019 Anxiety Care Manager contacted the patient by telephone to perform post hospital ) discharge assessment. Verified  and zip code with patient as identifiers. Provided introduction to self, and explanation of the Nurse Care Manager role. Patient's primary care provider relationship reviewed with patient and modified, as applicable. Red Flags: 
Reviewed discharge instructions. Condition Focused Assessment:  
Patient reports the following: Gastrointestinal Condition Focused Assessment Skin- any open wounds, incisions or appliance no Description of wound- na New or worsening pain? no If yes, pain rated 0-10: na Location/pain characteristics: na  
New or worsening numbness or tingling? no If yes, location of numbness and tingling: na Activity level- moving several times a day, or as recommended? yes Abnormal activity level reported: yes Nutrition- prescribed diet? no  
Diet prescribed or recommended: NA Difficulty swallowing no Last weight of 114 lbs. In the last 24 hour have you experienced; Fever no Low body temperature no Chills or shaking no Sweating no Fast heart rate no Fast breathing no Dizziness/lightheadedness no Confusion or unusual change in mental status no Diarrhea no Nausea no Vomiting no Shortness of breath or difficulty breathing no Less urine output no Cold, clammy, and pale skin no Skin rash or skin color changes no Medication:  
New Medications at Discharge: Reglan Changed Medications at Discharge: Protonix Discontinued Medications at Discharge: None Current Outpatient Medications Medication Sig  pantoprazole (PROTONIX) 40 mg tablet Take 1 Tab by mouth daily.  metoclopramide HCl (REGLAN) 5 mg tablet Take 1 Tab by mouth Before breakfast, lunch, dinner and at bedtime for 10 days.  escitalopram oxalate (LEXAPRO) 10 mg tablet Take 1 Tab by mouth daily. Indications: major depressive disorder  levonorgestrel (MIRENA) 20 mcg/24 hr (5 years) IUD 1 Each by IntraUTERine route once. No current facility-administered medications for this visit. There are no discontinued medications. Performed medication reconciliation with patient, and patient verbalizes understanding of administration of home medications. There were no barriers to obtaining medications identified at this time. Inpatient RRAT score: Not available Was this a readmission? no Patient stated reason for the readmission: NA Barriers/Support system: 
patient and mother Home health/DME in place per discharge orders Barriers/Challenges to Care: []  Decline in memory    []  Language barrier    
[]  Emotional                  []  Limited mobility 
[]  Lack of motivation     [] Vision, hearing or cognitive impairment []  Knowledge [] Financial Barriers []  Lack of support  []  Pain []  Other []  None CM Identified  Problems 
 (contributing problems for risk for readmission) 1. Mental Health 2. Medication changes Discharge Instructions : 
Reviewed discharge instructions with patient. Patient verbalizes understanding of discharge instructions and follow-up care. Advance Care Planning:  
Patient was offered the opportunity to discuss advance care planning:  no    
Does patient have an Advance Directive:  no If no, did you provide information on [facility] Connections?  no  
 
PCP/Specialist follow up: Patient scheduled to follow up with Cassandra Pettit MD on 5/15/19. Reviewed red flags with patient, and patient verbalizes understanding. Patient given an opportunity to ask questions. No other clinical/social/functional needs noted. The patient agrees to contact the PCP office for questions related to their healthcare. The patient expressed thanks, offered no additional questions and ended the call.

## 2019-05-16 ENCOUNTER — TELEPHONE (OUTPATIENT)
Dept: SURGERY | Age: 19
End: 2019-05-16

## 2019-05-16 DIAGNOSIS — R93.5 ABNORMAL US (ULTRASOUND) OF ABDOMEN: ICD-10-CM

## 2019-05-16 DIAGNOSIS — R10.13 EPIGASTRIC PAIN: Primary | ICD-10-CM

## 2019-05-16 LAB
ATRIAL RATE: 58 BPM
CALCULATED P AXIS, ECG09: 70 DEGREES
CALCULATED R AXIS, ECG10: 93 DEGREES
CALCULATED T AXIS, ECG11: 71 DEGREES
DIAGNOSIS, 93000: NORMAL
P-R INTERVAL, ECG05: 154 MS
Q-T INTERVAL, ECG07: 418 MS
QRS DURATION, ECG06: 76 MS
QTC CALCULATION (BEZET), ECG08: 410 MS
VENTRICULAR RATE, ECG03: 58 BPM

## 2019-05-16 NOTE — ED PROVIDER NOTES
EMERGENCY DEPARTMENT HISTORY AND PHYSICAL EXAM 
 
 
Date: 5/15/2019 Patient Name: Dottie Castrejon History of Presenting Illness Chief Complaint Patient presents with  Abdominal Pain  
  mid- to upper- abd pain; recently discharged with gallbladder issues  Syncope  
  became diaphoretic and had syncopal episode in bathroom after abd. pain History Provided By: Patient and Patient's Mother HPI: Dottie Castrejon, 22 y.o. female with PMHx significant for anxiety, presents with syncope and abdominal pain. Patient was just discharged from the hospital 1 day ago. She had a work-up for abdominal pain and intractable nausea and vomiting. She had a negative HIDA scan, and endoscopy which showed esophagitis, and possibly has Gilbert's disease. She states that she had a sudden onset of the same kind of abdominal pain she was admitted for, and it was a 10 out of 10 in severity. It was nonradiating, and associated with nausea and diaphoresis. She subsequently had a syncopal episode which was witnessed by her mother. Mother states that just after about 5 to 6 seconds she came to. She did not sustain any trauma. She does state that she has a mild headache currently, but she did not hit her head. She denies chest pain or shortness of breath. She had tinnitus at the time, and states she did have a spinning sensation. She denies feeling dizziness currently. She denies fever or chills. There are no other complaints, changes, or physical findings at this time. PCP: Joselito Mulligan MD 
 
No current facility-administered medications on file prior to encounter. Current Outpatient Medications on File Prior to Encounter Medication Sig Dispense Refill  pantoprazole (PROTONIX) 40 mg tablet Take 1 Tab by mouth daily. 42 Tab 1  
 metoclopramide HCl (REGLAN) 5 mg tablet Take 1 Tab by mouth Before breakfast, lunch, dinner and at bedtime for 10 days.  40 Tab 0  
  escitalopram oxalate (LEXAPRO) 10 mg tablet Take 1 Tab by mouth daily. Indications: major depressive disorder 30 Tab 0  
 levonorgestrel (MIRENA) 20 mcg/24 hr (5 years) IUD 1 Each by IntraUTERine route once. Past History Past Medical History: 
Past Medical History:  
Diagnosis Date  Major depressive disorder 4/25/2019  Psychiatric disorder 2019 Anxiety Past Surgical History: 
Past Surgical History:  
Procedure Laterality Date  COLONOSCOPY N/A 5/14/2019 COLONOSCOPY performed by Lizet Rivas MD at [facility] ENDOSCOPY  COLONOSCOPY,DIAGNOSTIC  5/14/2019  HX HEENT    
 wisdom teeth  UPPER GI ENDOSCOPY,BIOPSY  11/16/2018 Family History: 
Family History Problem Relation Age of Onset  Other Mother   
     divericulitis, gallbladder removal  
 No Known Problems Father  Crohn's Disease Maternal Grandmother 48  
 Other Maternal Grandmother   
     divericulitis and gallbladder removal  
 Stroke Maternal Grandfather  Hypertension Maternal Grandfather  High Cholesterol Maternal Grandfather  Cataract Maternal Grandfather  Stroke Paternal Grandmother  Cancer Paternal Grandmother 79  
     kidney, breast---age 61  No Known Problems Paternal Grandfather Social History: 
Social History Tobacco Use  Smoking status: Current Some Day Smoker Packs/day: 0.00 Years: 0.00 Pack years: 0.00  Smokeless tobacco: Never Used  Tobacco comment: Per pt, \"occasionally\" Substance Use Topics  Alcohol use: Yes Comment: Per pt, \"rarely\"  Drug use: Yes Frequency: 7.0 times per week Types: Marijuana Allergies: 
No Known Allergies Review of Systems Review of Systems Constitutional: Negative for chills and fever. HENT: Positive for tinnitus. Negative for congestion. Eyes: Negative. Cardiovascular: Negative for chest pain. Gastrointestinal: Positive for abdominal pain and nausea. Endocrine: Negative for heat intolerance. Genitourinary: Negative. Musculoskeletal: Negative for back pain. Skin: Negative for rash. Allergic/Immunologic: Negative for immunocompromised state. Neurological: Positive for headaches. Hematological: Does not bruise/bleed easily. Psychiatric/Behavioral: Negative. All other systems reviewed and are negative. Physical Exam  
Physical Exam  
Constitutional: She is oriented to person, place, and time. She appears well-developed and well-nourished. No distress. HENT:  
Head: Normocephalic. Eyes: Pupils are equal, round, and reactive to light. EOM are normal.  
Neck: Normal range of motion. Neck supple. Cardiovascular: Normal rate, regular rhythm, normal heart sounds and intact distal pulses. Pulmonary/Chest: Effort normal and breath sounds normal. No respiratory distress. Abdominal: Soft. Bowel sounds are normal. There is no tenderness. Musculoskeletal: Normal range of motion. She exhibits no tenderness. Neurological: She is alert and oriented to person, place, and time. No cranial nerve deficit. Skin: Skin is warm and dry. Psychiatric: She has a normal mood and affect. Her behavior is normal.  
Nursing note and vitals reviewed. Diagnostic Study Results Labs - No results found for this or any previous visit (from the past 12 hour(s)). Radiologic Studies - No orders to display CT Results  (Last 48 hours) None CXR Results  (Last 48 hours) None Medical Decision Making I am the first provider for this patient. I reviewed the vital signs, available nursing notes, past medical history, past surgical history, family history and social history. Vital Signs-Reviewed the patient's vital signs. Patient Vitals for the past 12 hrs: 
 Temp Pulse Resp BP SpO2  
05/15/19 2015 98.4 °F (36.9 °C) 82 16 116/80 100 % Pulse Oximetry Analysis - 100% on room air Cardiac Monitor:  
Rate: 82 bpm 
 Rhythm: Normal Sinus Rhythm EKG interpretation: (Preliminary) Rhythm: sinus bradycardia; and regular . Rate (approx.): 58; Axis: right; NV interval: normal; QRS interval: normal ; ST/T wave: non-specific changes; Other findings: No prior EKGs. Records Reviewed: Nursing Notes and Old Medical Records Provider Notes (Medical Decision Making):  
Syncope, dehydration, anemia, electrolyte abnormality, gastritis, esophagitis, biliary colic ED Course:  
Initial assessment performed. The patients presenting problems have been discussed, and they are in agreement with the care plan formulated and outlined with them. I have encouraged them to ask questions as they arise throughout their visit. Progress note: The patient is feeling completely better. Her results were reviewed. 10:40 PM 
Consult note: I discussed the case with Dr. Liyah Mtz general surgery. He agrees that the patient can follow-up as an outpatient Critical Care Time:  
0 Disposition: 
Home PLAN: 
1. Discharge Medication List as of 5/15/2019 11:08 PM  
  
START taking these medications Details  
meclizine (ANTIVERT) 25 mg tablet Take 1 Tab by mouth three (3) times daily as needed for Dizziness., Normal, Disp-20 Tab, R-0  
  
ondansetron (ZOFRAN ODT) 4 mg disintegrating tablet Take 1 Tab by mouth every eight (8) hours as needed for Nausea., Normal, Disp-10 Tab, R-0  
  
potassium chloride SR (KLOR-CON 10) 10 mEq tablet Take 1 Tab by mouth daily. , Normal, Disp-7 Tab, R-0  
  
  
CONTINUE these medications which have NOT CHANGED Details  
pantoprazole (PROTONIX) 40 mg tablet Take 1 Tab by mouth daily. , Print, Disp-42 Tab, R-1  
  
metoclopramide HCl (REGLAN) 5 mg tablet Take 1 Tab by mouth Before breakfast, lunch, dinner and at bedtime for 10 days. , Print, Disp-40 Tab, R-0  
  
escitalopram oxalate (LEXAPRO) 10 mg tablet Take 1 Tab by mouth daily.  Indications: major depressive disorder, Print, Disp-30 Tab, R-0  
  
 levonorgestrel (MIRENA) 20 mcg/24 hr (5 years) IUD 1 Each by IntraUTERine route once., Historical Med 2. Follow-up Information Follow up With Specialties Details Why Contact Info Faina Galicia MD Gastroenterology  As scheduled [address] [facility] 
[phone] Joe Pretty MD General Surgery, Breast Surgery, Colon and Rectal Surgery, Endocrinology Call in 1 day  [address] [facility] 
[phone] Odetta Closs, MD Family Practice  As needed [address] 
[phone] \[facility]\"" EMERGENCY DEPT Emergency Medicine  If symptoms worsen [address] [facility] 
[phone] Return to ED if worse Diagnosis Clinical Impression: 1. Syncope and collapse 2. Abdominal pain, LUQ (left upper quadrant) 3. Abdominal pain, right upper quadrant 4. Tinnitus, unspecified laterality 5. Dehydration Attestations: This chart was completed by myself, Dr. Joelle Ryan

## 2019-05-16 NOTE — TELEPHONE ENCOUNTER
Spoke with patient's mother who is not on HIPPA  Mother stated Molly Postin found patient passed out on the floor, unconscious on yesterday and was seen in the ED. \"    Informed mother  patient will need to sign a consent giving us permission to discuss patient's health information with her. Mother voiced understanding \"stating \"she's a nurse, and works for [employer], and that [facility]'s [facility]. \"    Call was transferred to Dr. Edward Ramey to advise. Ultra sound was ordered by Edward Ramey    Informed patient that she's scheduled for ultra sound on  5/22/19 at 9:30 am with an arrival time of 9 am. Patient instructed NPO 8 hours prior to test.       Patient was asked to stop by office after test, to update (PHI) forms. Patient voiced understanding.

## 2019-05-16 NOTE — ED NOTES
Mom states she is an RN on [employer]. She would like GI consult ASAP due to pt just being d/c from hospital yesterday and continues to have abd issues and now new issue of syncopal episode.

## 2019-05-16 NOTE — ED NOTES
D/C instructions given by MD. All questions answered. Pt in NAD and able to ambulate out of the department independently.

## 2019-05-16 NOTE — TELEPHONE ENCOUNTER
Patient's mother calling needs to speak with a nurse. Patient saw Dr. Harris Krueger in the hospital for a consultation for gallbladder at the beginning of this week. Patient ended up back in ER last night, mother found her in the floor unconscious soaked in sweat. Please advise.

## 2019-05-16 NOTE — DISCHARGE INSTRUCTIONS
Dehydration: Care Instructions  Your Care Instructions  Dehydration happens when your body loses too much fluid. This might happen when you do not drink enough water or you lose large amounts of fluids from your body because of diarrhea, vomiting, or sweating. Severe dehydration can be life-threatening. Water and minerals called electrolytes help put your body fluids back in balance. Learn the early signs of fluid loss, and drink more fluids to prevent dehydration. Follow-up care is a key part of your treatment and safety. Be sure to make and go to all appointments, and call your doctor if you are having problems. It's also a good idea to know your test results and keep a list of the medicines you take. How can you care for yourself at home? · To prevent dehydration, drink plenty of fluids, enough so that your urine is light yellow or clear like water. Choose water and other caffeine-free clear liquids until you feel better. If you have kidney, heart, or liver disease and have to limit fluids, talk with your doctor before you increase the amount of fluids you drink. · If you do not feel like eating or drinking, try taking small sips of water, sports drinks, or other rehydration drinks. · Get plenty of rest.  To prevent dehydration  · Add more fluids to your diet and daily routine, unless your doctor has told you not to. · During hot weather, drink more fluids. Drink even more fluids if you exercise a lot. Stay away from drinks with alcohol or caffeine. · Watch for the symptoms of dehydration. These include:  ? A dry, sticky mouth. ? Dark yellow urine, and not much of it. ? Dry and sunken eyes. ? Feeling very tired. · Learn what problems can lead to dehydration. These include:  ? Diarrhea, fever, and vomiting. ? Any illness with a fever, such as pneumonia or the flu. ? Activities that cause heavy sweating, such as endurance races and heavy outdoor work in hot or humid weather. ?  Alcohol or drug abuse or withdrawal.  ? Certain medicines, such as cold and allergy pills (antihistamines), diet pills (diuretics), and laxatives. ? Certain diseases, such as diabetes, cancer, and heart or kidney disease. When should you call for help? Call 911 anytime you think you may need emergency care. For example, call if:    · You passed out (lost consciousness).    Call your doctor now or seek immediate medical care if:    · You are confused and cannot think clearly.     · You are dizzy or lightheaded, or you feel like you may faint.     · You have signs of needing more fluids. You have sunken eyes and a dry mouth, and you pass only a little dark urine.     · You cannot keep fluids down.    Watch closely for changes in your health, and be sure to contact your doctor if:    · You are not making tears.     · Your skin is very dry and sags slowly back into place after you pinch it.     · Your mouth and eyes are very dry. Where can you learn more? Go to http://john-trini.info/. Enter U768 in the search box to learn more about \"Dehydration: Care Instructions. \"  Current as of: September 23, 2018  Content Version: 11.9  © 2548-3409 [employer]. Care instructions adapted under license by [employer] (which disclaims liability or warranty for this information). If you have questions about a medical condition or this instruction, always ask your healthcare professional. [place] [address] any warranty or liability for your use of this information. Patient Education        Abdominal Pain: Care Instructions  Your Care Instructions    Abdominal pain has many possible causes. Some aren't serious and get better on their own in a few days. Others need more testing and treatment. If your pain continues or gets worse, you need to be rechecked and may need more tests to find out what is wrong. You may need surgery to correct the problem.   Don't ignore new symptoms, such as fever, nausea and vomiting, urination problems, pain that gets worse, and dizziness. These may be signs of a more serious problem. Your doctor may have recommended a follow-up visit in the next 8 to 12 hours. If you are not getting better, you may need more tests or treatment. The doctor has checked you carefully, but problems can develop later. If you notice any problems or new symptoms, get medical treatment right away. Follow-up care is a key part of your treatment and safety. Be sure to make and go to all appointments, and call your doctor if you are having problems. It's also a good idea to know your test results and keep a list of the medicines you take. How can you care for yourself at home? · Rest until you feel better. · To prevent dehydration, drink plenty of fluids, enough so that your urine is light yellow or clear like water. Choose water and other caffeine-free clear liquids until you feel better. If you have kidney, heart, or liver disease and have to limit fluids, talk with your doctor before you increase the amount of fluids you drink. · If your stomach is upset, eat mild foods, such as rice, dry toast or crackers, bananas, and applesauce. Try eating several small meals instead of two or three large ones. · Wait until 48 hours after all symptoms have gone away before you have spicy foods, alcohol, and drinks that contain caffeine. · Do not eat foods that are high in fat. · Avoid anti-inflammatory medicines such as aspirin, ibuprofen (Advil, Motrin), and naproxen (Aleve). These can cause stomach upset. Talk to your doctor if you take daily aspirin for another health problem. When should you call for help? Call 911 anytime you think you may need emergency care.  For example, call if:    · You passed out (lost consciousness).     · You pass maroon or very bloody stools.     · You vomit blood or what looks like coffee grounds.     · You have new, severe belly pain.    Call your doctor now or seek immediate medical care if:    · Your pain gets worse, especially if it becomes focused in one area of your belly.     · You have a new or higher fever.     · Your stools are black and look like tar, or they have streaks of blood.     · You have unexpected vaginal bleeding.     · You have symptoms of a urinary tract infection. These may include:  ? Pain when you urinate. ? Urinating more often than usual.  ? Blood in your urine.     · You are dizzy or lightheaded, or you feel like you may faint.    Watch closely for changes in your health, and be sure to contact your doctor if:    · You are not getting better after 1 day (24 hours). Where can you learn more? Go to http://john-trini.info/. Enter Y589 in the search box to learn more about \"Abdominal Pain: Care Instructions. \"  Current as of: September 23, 2018  Content Version: 11.9  © 0146-1124 [employer]. Care instructions adapted under license by [employer] (which disclaims liability or warranty for this information). If you have questions about a medical condition or this instruction, always ask your healthcare professional. [place] [address] any warranty or liability for your use of this information. Patient Education        Fainting: Care Instructions  Your Care Instructions    When you faint, or pass out, you lose consciousness for a short time. A brief drop in blood flow to the brain often causes it. When you fall or lie down, more blood flows to your brain and you regain consciousness. Emotional stress, pain, or overheating--especially if you have been standing--can make you faint. In these cases, fainting is usually not serious. But fainting can be a sign of a more serious problem. Your doctor may want you to have more tests to rule out other causes. The treatment you need depends on the reason why you fainted.   The doctor has checked you carefully, but problems can develop later. If you notice any problems or new symptoms, get medical treatment right away. Follow-up care is a key part of your treatment and safety. Be sure to make and go to all appointments, and call your doctor if you are having problems. It's also a good idea to know your test results and keep a list of the medicines you take. How can you care for yourself at home? · Drink plenty of fluids to prevent dehydration. If you have kidney, heart, or liver disease and have to limit fluids, talk with your doctor before you increase your fluid intake. When should you call for help? Call 911 anytime you think you may need emergency care. For example, call if:    · You have symptoms of a heart problem. These may include:  ? Chest pain or pressure. ? Severe trouble breathing. ? A fast or irregular heartbeat. ? Lightheadedness or sudden weakness. ? Coughing up pink, foamy mucus. ? Passing out. After you call 911, the  may tell you to chew 1 adult-strength or 2 to 4 low-dose aspirin. Wait for an ambulance. Do not try to drive yourself.     · You have symptoms of a stroke. These may include:  ? Sudden numbness, tingling, weakness, or loss of movement in your face, arm, or leg, especially on only one side of your body. ? Sudden vision changes. ? Sudden trouble speaking. ? Sudden confusion or trouble understanding simple statements. ? Sudden problems with walking or balance. ? A sudden, severe headache that is different from past headaches.     · You passed out (lost consciousness) again.    Watch closely for changes in your health, and be sure to contact your doctor if:    · You do not get better as expected. Where can you learn more? Go to http://john-trini.info/. Enter N144 in the search box to learn more about \"Fainting: Care Instructions. \"  Current as of: September 23, 2018  Content Version: 11.9  © 2694-8613 [employer], [employer].  Care instructions adapted under license by [employer] (which disclaims liability or warranty for this information). If you have questions about a medical condition or this instruction, always ask your healthcare professional. [address] any warranty or liability for your use of this information. Patient Education        Tinnitus: Care Instructions  Your Care Instructions    Many people have some ringing sounds in their ears once in a while. You may hear a roar, a hiss, a tinkle, or a buzz. The sound usually lasts only a few minutes. If it goes on all the time, you may have tinnitus. Tinnitus is usually caused by long-term exposure to loud noise. This damages the nerves in the inner ear. It can occur with all types of hearing loss. It may be a symptom of almost any ear problem. Tinnitus may be caused by a buildup of earwax. Or it may be caused by ear infections or certain medicines (especially antibiotics or large amounts of aspirin). You can also hear noises in your ears because of an injury to the ears, drinking too much alcohol or caffeine, or a medical condition. You may need tests to evaluate your hearing and to find causes of long-lasting tinnitus. Your doctor may suggest one or more treatments to help you cope with it. You can also do things at home to help reduce symptoms. Follow-up care is a key part of your treatment and safety. Be sure to make and go to all appointments, and call your doctor if you are having problems. It's also a good idea to know your test results and keep a list of the medicines you take. How can you care for yourself at home? · Limit or cut out alcohol and caffeine. They can make your symptoms worse. · Do not smoke or use other tobacco products. Nicotine reduces blood flow to the ear and makes tinnitus worse. If you need help quitting, talk to your doctor about stop-smoking programs and medicines. These can increase your chances of quitting for good.   · Talk to your doctor about whether to stop taking aspirin and similar products such as ibuprofen or naproxen. · Get exercise often. It can improve blood flow to the ear. Ways to cope with noise  Some tinnitus may last a long time. To cope with noise, try to:  · Avoid noises that you think caused your tinnitus. If you can't avoid loud noises, wear earplugs or earmuffs. · Ignore the sound by paying attention to other things. · Relax using biofeedback, meditation, or yoga. Feeling stressed and being tired can make tinnitus worse. · Play music or white noise to help you sleep. Background noise may cover up the noise that you hear in your ears. You can buy a machine that makes soothing sounds, such as ocean waves. When should you call for help? Call 911 anytime you think you may need emergency care. For example, call if:    · You have symptoms of a stroke. These may include:  ? Sudden numbness, tingling, weakness, or loss of movement in your face, arm, or leg, especially on only one side of your body. ? Sudden vision changes. ? Sudden trouble speaking. ? Sudden confusion or trouble understanding simple statements. ? Sudden problems with walking or balance. ? A sudden, severe headache that is different from past headaches.    Call your doctor now or seek immediate medical care if:    · You develop other symptoms. These may include hearing loss (or worse hearing loss), balance problems, dizziness, nausea, or vomiting.    Watch closely for changes in your health, and be sure to contact your doctor if:    · Your tinnitus moves from both ears to one ear.     · Your hearing loss gets worse within 1 day after an ear injury.     · Your tinnitus or hearing loss does not get better within 1 week after an ear injury.     · Your tinnitus bothers you enough that you want to take medicines to help you cope with it. Where can you learn more? Go to http://john-trini.info/.   Enter S165 in the search box to learn more about \"Tinnitus: Care Instructions. \"  Current as of: March 27, 2018  Content Version: 11.9  © 6648-8067 [employer]. Care instructions adapted under license by [employer] (which disclaims liability or warranty for this information). If you have questions about a medical condition or this instruction, always ask your healthcare professional. [place] [address] any warranty or liability for your use of this information.

## 2019-05-22 ENCOUNTER — TELEPHONE (OUTPATIENT)
Dept: SURGERY | Age: 19
End: 2019-05-22

## 2019-05-22 ENCOUNTER — HOSPITAL ENCOUNTER (OUTPATIENT)
Dept: ULTRASOUND IMAGING | Age: 19
Discharge: HOME | End: 2019-05-22
Attending: SURGERY
Payer: COMMERCIAL

## 2019-05-22 DIAGNOSIS — R10.13 EPIGASTRIC PAIN: ICD-10-CM

## 2019-05-22 PROCEDURE — 76705 ECHO EXAM OF ABDOMEN: CPT

## 2019-05-22 NOTE — TELEPHONE ENCOUNTER
Made an attempt to contact patient's mother regarding Ultrasound resutls, but was unable to reach her. Left message for patient to contact office to discuss.

## 2019-05-22 NOTE — TELEPHONE ENCOUNTER
Spoke with patient's mother on HIPPA   Informed mother that I will have Dr. Waleska Ochoa, contact her regarding Ultrasound results. Mother voiced understanding stating \"patient is having milder symptoms of abdomin pain, cramping, and nausea. \"

## 2019-05-22 NOTE — TELEPHONE ENCOUNTER
Patient's mother called to get results of her daughter's ultrasound and to see what the plan is for her daughter.  She would like a return call from Dr ARRIOLA

## 2019-05-28 RX ORDER — METOCLOPRAMIDE 10 MG/1
10 TABLET ORAL
COMMUNITY
End: 2019-07-02

## 2019-05-28 NOTE — PERIOP NOTES
[facility]  Preoperative Instructions        Surgery Date 5/30/2019          Time of Arrival 1100    1. On the day of your surgery, please report to the Surgical Services Registration Desk and sign in at your designated time. The Surgery Center is located to the right of the Emergency Room. 2. You must have someone with you to drive you home. You should not drive a car for 24 hours following surgery. Please make arrangements for a friend or family member to stay with you for the first 24 hours after your surgery. 3. Do not have anything to eat or drink (including water, gum, mints, coffee, juice) after midnight 5/29/2019. ? This may not apply to medications prescribed by your physician. ?(Please note below the special instructions with medications to take the morning of your procedure.)    4. We recommend you do not drink any alcoholic beverages for 24 hours before and after your surgery. 5. Contact your surgeons office for instructions on the following medications: non-steroidal anti-inflammatory drugs (i.e. Advil, Aleve), vitamins, and supplements. (Some surgeons will want you to stop these medications prior to surgery and others may allow you to take them)  **If you are currently taking Plavix, Coumadin, Aspirin and/or other blood-thinning agents, contact your surgeon for instructions. ** Your surgeon will partner with the physician prescribing these medications to determine if it is safe to stop or if you need to continue taking. Please do not stop taking these medications without instructions from your surgeon    6. Wear comfortable clothes. Wear glasses instead of contacts. Do not bring any money or jewelry. Please bring picture ID, insurance card, and any prearranged co-payment or hospital payment. Do not wear make-up, particularly mascara the morning of your surgery. Do not wear nail polish, particularly if you are having foot /hand surgery.   Wear your hair loose or down, no ponytails, buns, cassandra pins or clips. All body piercings must be removed. Please shower with antibacterial soap for three consecutive days before and on the morning of surgery, but do not apply any lotions, powders or deodorants after the shower on the day of surgery. Please use a fresh towels after each shower. Please sleep in clean clothes and change bed linens the night before surgery. Please do not shave for 48 hours prior to surgery. Shaving of the face is acceptable. 7. You should understand that if you do not follow these instructions your surgery may be cancelled. If your physical condition changes (I.e. fever, cold or flu) please contact your surgeon as soon as possible. 8. It is important that you be on time. If a situation occurs where you may be late, please call [phone] (OR Holding Area). 9. If you have any questions and or problems, please call [phone] (Pre-admission Testing). 10. Your surgery time may be subject to change. You will receive a phone call the evening prior if your time changes. 11.  If having outpatient surgery, you must have someone to drive you here, stay with you during the duration of your stay, and to drive you home at time of discharge. 12.   In an effort to improve the efficiency, privacy, and safety for all of our Pre-op patients visitors are not allowed in the Holding area. Once you arrive and are registered your family/visitors will be asked to remain in the waiting room. The Pre-op staff will get you from the Surgical Waiting Area and will explain to you and your family/visitors that the Pre-op phase is beginning. The staff will answer any questions and provide instructions for tracking of the patient, by use of the existing tracking number and color-coded status board in the waiting room.   At this time the staff will also ask for your designated spokesperson information in the event that the physician or staff need to provide an update or obtain any pertinent information. The designated spokesperson will be notified if the physician needs to speak to family during the pre-operative phase. If at any time your family/visitors has questions or concerns they may approach the volunteer desk in the waiting area for assistance. Special Instructions:    MEDICATIONS TO TAKE THE MORNING OF SURGERY WITH A SIP OF WATER:  Lexapro, antiver, zofran, protonix, reglan. I understand a pre-operative phone call will be made to verify my surgery time. In the event that I am not available, I give permission for a message to be left on my answering service and/or with another person?   Yes patient @ [phone]         ___________________      __________   _________    (Signature of Patient)             (Witness)                (Date and Time)

## 2019-05-30 ENCOUNTER — HOSPITAL ENCOUNTER (OUTPATIENT)
Age: 19
Setting detail: OBSERVATION
Discharge: HOME | End: 2019-05-31
Attending: SURGERY | Admitting: SURGERY
Payer: COMMERCIAL

## 2019-05-30 ENCOUNTER — ANESTHESIA EVENT (OUTPATIENT)
Dept: SURGERY | Age: 19
End: 2019-05-30
Payer: COMMERCIAL

## 2019-05-30 ENCOUNTER — APPOINTMENT (OUTPATIENT)
Dept: GENERAL RADIOLOGY | Age: 19
End: 2019-05-30
Attending: SURGERY
Payer: COMMERCIAL

## 2019-05-30 ENCOUNTER — ANESTHESIA (OUTPATIENT)
Dept: SURGERY | Age: 19
End: 2019-05-30
Payer: COMMERCIAL

## 2019-05-30 DIAGNOSIS — K82.8 BILIARY DYSKINESIA: Primary | ICD-10-CM

## 2019-05-30 LAB — HCG UR QL: NEGATIVE

## 2019-05-30 PROCEDURE — 88304 TISSUE EXAM BY PATHOLOGIST: CPT

## 2019-05-30 PROCEDURE — 74011250636 HC RX REV CODE- 250/636

## 2019-05-30 PROCEDURE — 74011250637 HC RX REV CODE- 250/637

## 2019-05-30 PROCEDURE — 76210000016 HC OR PH I REC 1 TO 1.5 HR: Performed by: SURGERY

## 2019-05-30 PROCEDURE — 77030031139 HC SUT VCRL2 J&J -A: Performed by: SURGERY

## 2019-05-30 PROCEDURE — 77030026438 HC STYL ET INTUB CARD -A: Performed by: NURSE ANESTHETIST, CERTIFIED REGISTERED

## 2019-05-30 PROCEDURE — 74011000250 HC RX REV CODE- 250: Performed by: SURGERY

## 2019-05-30 PROCEDURE — 77030020053 HC ELECTRD LAPSCP COVD -B: Performed by: SURGERY

## 2019-05-30 PROCEDURE — 74011000250 HC RX REV CODE- 250

## 2019-05-30 PROCEDURE — 77030032490 HC SLV COMPR SCD KNE COVD -B: Performed by: SURGERY

## 2019-05-30 PROCEDURE — 51798 US URINE CAPACITY MEASURE: CPT

## 2019-05-30 PROCEDURE — 77030039266 HC ADH SKN EXOFIN S2SG -A: Performed by: SURGERY

## 2019-05-30 PROCEDURE — 99218 HC RM OBSERVATION: CPT

## 2019-05-30 PROCEDURE — 81025 URINE PREGNANCY TEST: CPT

## 2019-05-30 PROCEDURE — 77030008771 HC TU NG SALEM SUMP -A: Performed by: NURSE ANESTHETIST, CERTIFIED REGISTERED

## 2019-05-30 PROCEDURE — 74011250636 HC RX REV CODE- 250/636: Performed by: SURGERY

## 2019-05-30 PROCEDURE — 77030010837 HC CATH CHOL INTRO TELE -B: Performed by: SURGERY

## 2019-05-30 PROCEDURE — 74011250636 HC RX REV CODE- 250/636: Performed by: ANESTHESIOLOGY

## 2019-05-30 PROCEDURE — 77030035045 HC TRCR ENDOSC VRSPRT BLDLSS COVD -B: Performed by: SURGERY

## 2019-05-30 PROCEDURE — 77030018836 HC SOL IRR NACL ICUM -A: Performed by: SURGERY

## 2019-05-30 PROCEDURE — 77030035048 HC TRCR ENDOSC OPTCL COVD -B: Performed by: SURGERY

## 2019-05-30 PROCEDURE — 77030018875 HC APPL CLP LIG4 J&J -B: Performed by: SURGERY

## 2019-05-30 PROCEDURE — 76010000149 HC OR TIME 1 TO 1.5 HR: Performed by: SURGERY

## 2019-05-30 PROCEDURE — 77030037892: Performed by: SURGERY

## 2019-05-30 PROCEDURE — 77030002933 HC SUT MCRYL J&J -A: Performed by: SURGERY

## 2019-05-30 PROCEDURE — 74011250637 HC RX REV CODE- 250/637: Performed by: SURGERY

## 2019-05-30 PROCEDURE — 74300 X-RAY BILE DUCTS/PANCREAS: CPT

## 2019-05-30 PROCEDURE — 76060000033 HC ANESTHESIA 1 TO 1.5 HR: Performed by: SURGERY

## 2019-05-30 PROCEDURE — 77030008684 HC TU ET CUF COVD -B: Performed by: NURSE ANESTHETIST, CERTIFIED REGISTERED

## 2019-05-30 PROCEDURE — 77030020256 HC SOL INJ NACL 0.9%  500ML: Performed by: SURGERY

## 2019-05-30 PROCEDURE — 77030035051: Performed by: SURGERY

## 2019-05-30 PROCEDURE — 77030019908 HC STETH ESOPH SIMS -A: Performed by: NURSE ANESTHETIST, CERTIFIED REGISTERED

## 2019-05-30 PROCEDURE — 77030011640 HC PAD GRND REM COVD -A: Performed by: SURGERY

## 2019-05-30 PROCEDURE — 77030008756 HC TU IRR SUC STRY -B: Performed by: SURGERY

## 2019-05-30 PROCEDURE — 74011636320 HC RX REV CODE- 636/320: Performed by: SURGERY

## 2019-05-30 RX ORDER — HYDROMORPHONE HYDROCHLORIDE 2 MG/ML
INJECTION, SOLUTION INTRAMUSCULAR; INTRAVENOUS; SUBCUTANEOUS AS NEEDED
Status: DISCONTINUED | OUTPATIENT
Start: 2019-05-30 | End: 2019-05-30 | Stop reason: HOSPADM

## 2019-05-30 RX ORDER — PANTOPRAZOLE SODIUM 40 MG/1
40 TABLET, DELAYED RELEASE ORAL DAILY
Status: DISCONTINUED | OUTPATIENT
Start: 2019-05-31 | End: 2019-05-31 | Stop reason: HOSPADM

## 2019-05-30 RX ORDER — ENOXAPARIN SODIUM 100 MG/ML
30 INJECTION SUBCUTANEOUS EVERY 24 HOURS
Status: DISCONTINUED | OUTPATIENT
Start: 2019-05-30 | End: 2019-05-31 | Stop reason: HOSPADM

## 2019-05-30 RX ORDER — SODIUM CHLORIDE 0.9 % (FLUSH) 0.9 %
5-40 SYRINGE (ML) INJECTION EVERY 8 HOURS
Status: DISCONTINUED | OUTPATIENT
Start: 2019-05-30 | End: 2019-05-30 | Stop reason: HOSPADM

## 2019-05-30 RX ORDER — SODIUM CHLORIDE, SODIUM LACTATE, POTASSIUM CHLORIDE, CALCIUM CHLORIDE 600; 310; 30; 20 MG/100ML; MG/100ML; MG/100ML; MG/100ML
75 INJECTION, SOLUTION INTRAVENOUS CONTINUOUS
Status: DISCONTINUED | OUTPATIENT
Start: 2019-05-30 | End: 2019-05-30 | Stop reason: HOSPADM

## 2019-05-30 RX ORDER — ACETAMINOPHEN 10 MG/ML
INJECTION, SOLUTION INTRAVENOUS AS NEEDED
Status: DISCONTINUED | OUTPATIENT
Start: 2019-05-30 | End: 2019-05-30 | Stop reason: HOSPADM

## 2019-05-30 RX ORDER — SODIUM CHLORIDE, SODIUM LACTATE, POTASSIUM CHLORIDE, CALCIUM CHLORIDE 600; 310; 30; 20 MG/100ML; MG/100ML; MG/100ML; MG/100ML
25 INJECTION, SOLUTION INTRAVENOUS CONTINUOUS
Status: DISCONTINUED | OUTPATIENT
Start: 2019-05-30 | End: 2019-05-30 | Stop reason: HOSPADM

## 2019-05-30 RX ORDER — SODIUM CHLORIDE 0.9 % (FLUSH) 0.9 %
5-40 SYRINGE (ML) INJECTION AS NEEDED
Status: DISCONTINUED | OUTPATIENT
Start: 2019-05-30 | End: 2019-05-30 | Stop reason: HOSPADM

## 2019-05-30 RX ORDER — METOCLOPRAMIDE 10 MG/1
10 TABLET ORAL
Status: DISCONTINUED | OUTPATIENT
Start: 2019-05-30 | End: 2019-05-31 | Stop reason: HOSPADM

## 2019-05-30 RX ORDER — POLYETHYLENE GLYCOL 3350 17 G/17G
17 POWDER, FOR SOLUTION ORAL DAILY
Status: DISCONTINUED | OUTPATIENT
Start: 2019-05-31 | End: 2019-05-31 | Stop reason: HOSPADM

## 2019-05-30 RX ORDER — SODIUM CHLORIDE 0.9 % (FLUSH) 0.9 %
5-40 SYRINGE (ML) INJECTION EVERY 8 HOURS
Status: DISCONTINUED | OUTPATIENT
Start: 2019-05-30 | End: 2019-05-31 | Stop reason: HOSPADM

## 2019-05-30 RX ORDER — MORPHINE SULFATE 10 MG/ML
2 INJECTION, SOLUTION INTRAMUSCULAR; INTRAVENOUS
Status: DISCONTINUED | OUTPATIENT
Start: 2019-05-30 | End: 2019-05-30 | Stop reason: HOSPADM

## 2019-05-30 RX ORDER — LIDOCAINE HYDROCHLORIDE 20 MG/ML
INJECTION, SOLUTION EPIDURAL; INFILTRATION; INTRACAUDAL; PERINEURAL AS NEEDED
Status: DISCONTINUED | OUTPATIENT
Start: 2019-05-30 | End: 2019-05-30 | Stop reason: HOSPADM

## 2019-05-30 RX ORDER — DEXAMETHASONE SODIUM PHOSPHATE 4 MG/ML
INJECTION, SOLUTION INTRA-ARTICULAR; INTRALESIONAL; INTRAMUSCULAR; INTRAVENOUS; SOFT TISSUE AS NEEDED
Status: DISCONTINUED | OUTPATIENT
Start: 2019-05-30 | End: 2019-05-30 | Stop reason: HOSPADM

## 2019-05-30 RX ORDER — PROPOFOL 10 MG/ML
INJECTION, EMULSION INTRAVENOUS AS NEEDED
Status: DISCONTINUED | OUTPATIENT
Start: 2019-05-30 | End: 2019-05-30 | Stop reason: HOSPADM

## 2019-05-30 RX ORDER — MIDAZOLAM HYDROCHLORIDE 1 MG/ML
1 INJECTION, SOLUTION INTRAMUSCULAR; INTRAVENOUS AS NEEDED
Status: DISCONTINUED | OUTPATIENT
Start: 2019-05-30 | End: 2019-05-30 | Stop reason: HOSPADM

## 2019-05-30 RX ORDER — IBUPROFEN 400 MG/1
800 TABLET ORAL 3 TIMES DAILY
Status: DISCONTINUED | OUTPATIENT
Start: 2019-05-30 | End: 2019-05-31 | Stop reason: HOSPADM

## 2019-05-30 RX ORDER — NEOSTIGMINE METHYLSULFATE 1 MG/ML
INJECTION INTRAVENOUS AS NEEDED
Status: DISCONTINUED | OUTPATIENT
Start: 2019-05-30 | End: 2019-05-30 | Stop reason: HOSPADM

## 2019-05-30 RX ORDER — MIDAZOLAM HYDROCHLORIDE 1 MG/ML
INJECTION, SOLUTION INTRAMUSCULAR; INTRAVENOUS AS NEEDED
Status: DISCONTINUED | OUTPATIENT
Start: 2019-05-30 | End: 2019-05-30 | Stop reason: HOSPADM

## 2019-05-30 RX ORDER — ALBUTEROL SULFATE 90 UG/1
AEROSOL, METERED RESPIRATORY (INHALATION) AS NEEDED
Status: DISCONTINUED | OUTPATIENT
Start: 2019-05-30 | End: 2019-05-30 | Stop reason: HOSPADM

## 2019-05-30 RX ORDER — SODIUM CHLORIDE 0.9 % (FLUSH) 0.9 %
5-40 SYRINGE (ML) INJECTION AS NEEDED
Status: DISCONTINUED | OUTPATIENT
Start: 2019-05-30 | End: 2019-05-31 | Stop reason: HOSPADM

## 2019-05-30 RX ORDER — NALOXONE HYDROCHLORIDE 0.4 MG/ML
0.4 INJECTION, SOLUTION INTRAMUSCULAR; INTRAVENOUS; SUBCUTANEOUS AS NEEDED
Status: DISCONTINUED | OUTPATIENT
Start: 2019-05-30 | End: 2019-05-31 | Stop reason: HOSPADM

## 2019-05-30 RX ORDER — FENTANYL CITRATE 50 UG/ML
50 INJECTION, SOLUTION INTRAMUSCULAR; INTRAVENOUS AS NEEDED
Status: DISCONTINUED | OUTPATIENT
Start: 2019-05-30 | End: 2019-05-30 | Stop reason: HOSPADM

## 2019-05-30 RX ORDER — ONDANSETRON 2 MG/ML
4 INJECTION INTRAMUSCULAR; INTRAVENOUS AS NEEDED
Status: DISCONTINUED | OUTPATIENT
Start: 2019-05-30 | End: 2019-05-30 | Stop reason: HOSPADM

## 2019-05-30 RX ORDER — DIPHENHYDRAMINE HYDROCHLORIDE 50 MG/ML
12.5 INJECTION, SOLUTION INTRAMUSCULAR; INTRAVENOUS AS NEEDED
Status: DISCONTINUED | OUTPATIENT
Start: 2019-05-30 | End: 2019-05-30 | Stop reason: HOSPADM

## 2019-05-30 RX ORDER — LIDOCAINE HYDROCHLORIDE 10 MG/ML
0.1 INJECTION, SOLUTION EPIDURAL; INFILTRATION; INTRACAUDAL; PERINEURAL AS NEEDED
Status: DISCONTINUED | OUTPATIENT
Start: 2019-05-30 | End: 2019-05-30 | Stop reason: HOSPADM

## 2019-05-30 RX ORDER — HYDROCODONE BITARTRATE AND ACETAMINOPHEN 5; 325 MG/1; MG/1
1-2 TABLET ORAL
Status: DISCONTINUED | OUTPATIENT
Start: 2019-05-30 | End: 2019-05-31 | Stop reason: HOSPADM

## 2019-05-30 RX ORDER — SODIUM CHLORIDE 9 MG/ML
50 INJECTION, SOLUTION INTRAVENOUS CONTINUOUS
Status: DISCONTINUED | OUTPATIENT
Start: 2019-05-30 | End: 2019-05-30 | Stop reason: HOSPADM

## 2019-05-30 RX ORDER — HYDROMORPHONE HYDROCHLORIDE 1 MG/ML
0.2 INJECTION, SOLUTION INTRAMUSCULAR; INTRAVENOUS; SUBCUTANEOUS
Status: DISCONTINUED | OUTPATIENT
Start: 2019-05-30 | End: 2019-05-30 | Stop reason: HOSPADM

## 2019-05-30 RX ORDER — FENTANYL CITRATE 50 UG/ML
25 INJECTION, SOLUTION INTRAMUSCULAR; INTRAVENOUS
Status: DISCONTINUED | OUTPATIENT
Start: 2019-05-30 | End: 2019-05-30 | Stop reason: HOSPADM

## 2019-05-30 RX ORDER — ONDANSETRON 4 MG/1
4 TABLET, ORALLY DISINTEGRATING ORAL
Status: DISCONTINUED | OUTPATIENT
Start: 2019-05-30 | End: 2019-05-31 | Stop reason: HOSPADM

## 2019-05-30 RX ORDER — ESCITALOPRAM OXALATE 10 MG/1
10 TABLET ORAL DAILY
Status: DISCONTINUED | OUTPATIENT
Start: 2019-05-31 | End: 2019-05-30

## 2019-05-30 RX ORDER — FENTANYL CITRATE 50 UG/ML
INJECTION, SOLUTION INTRAMUSCULAR; INTRAVENOUS AS NEEDED
Status: DISCONTINUED | OUTPATIENT
Start: 2019-05-30 | End: 2019-05-30 | Stop reason: HOSPADM

## 2019-05-30 RX ORDER — MECLIZINE HYDROCHLORIDE 25 MG/1
25 TABLET ORAL
Status: DISCONTINUED | OUTPATIENT
Start: 2019-05-30 | End: 2019-05-31 | Stop reason: HOSPADM

## 2019-05-30 RX ORDER — SODIUM CHLORIDE 9 MG/ML
125 INJECTION, SOLUTION INTRAVENOUS CONTINUOUS
Status: DISCONTINUED | OUTPATIENT
Start: 2019-05-30 | End: 2019-05-31 | Stop reason: HOSPADM

## 2019-05-30 RX ORDER — SCOLOPAMINE TRANSDERMAL SYSTEM 1 MG/1
PATCH, EXTENDED RELEASE TRANSDERMAL AS NEEDED
Status: DISCONTINUED | OUTPATIENT
Start: 2019-05-30 | End: 2019-05-30 | Stop reason: HOSPADM

## 2019-05-30 RX ORDER — MIDAZOLAM HYDROCHLORIDE 1 MG/ML
0.5 INJECTION, SOLUTION INTRAMUSCULAR; INTRAVENOUS
Status: DISCONTINUED | OUTPATIENT
Start: 2019-05-30 | End: 2019-05-30 | Stop reason: HOSPADM

## 2019-05-30 RX ORDER — ROCURONIUM BROMIDE 10 MG/ML
INJECTION, SOLUTION INTRAVENOUS AS NEEDED
Status: DISCONTINUED | OUTPATIENT
Start: 2019-05-30 | End: 2019-05-30 | Stop reason: HOSPADM

## 2019-05-30 RX ORDER — KETOROLAC TROMETHAMINE 30 MG/ML
INJECTION, SOLUTION INTRAMUSCULAR; INTRAVENOUS AS NEEDED
Status: DISCONTINUED | OUTPATIENT
Start: 2019-05-30 | End: 2019-05-30 | Stop reason: HOSPADM

## 2019-05-30 RX ORDER — POTASSIUM CHLORIDE 750 MG/1
10 TABLET, FILM COATED, EXTENDED RELEASE ORAL DAILY
Status: DISCONTINUED | OUTPATIENT
Start: 2019-05-31 | End: 2019-05-31 | Stop reason: HOSPADM

## 2019-05-30 RX ORDER — OXYCODONE AND ACETAMINOPHEN 5; 325 MG/1; MG/1
1 TABLET ORAL AS NEEDED
Status: DISCONTINUED | OUTPATIENT
Start: 2019-05-30 | End: 2019-05-30 | Stop reason: HOSPADM

## 2019-05-30 RX ORDER — ESCITALOPRAM OXALATE 10 MG/1
10 TABLET ORAL
Status: DISCONTINUED | OUTPATIENT
Start: 2019-05-30 | End: 2019-05-31 | Stop reason: HOSPADM

## 2019-05-30 RX ORDER — SUCCINYLCHOLINE CHLORIDE 20 MG/ML
INJECTION INTRAMUSCULAR; INTRAVENOUS AS NEEDED
Status: DISCONTINUED | OUTPATIENT
Start: 2019-05-30 | End: 2019-05-30 | Stop reason: HOSPADM

## 2019-05-30 RX ORDER — CEFAZOLIN SODIUM/WATER 2 G/20 ML
2 SYRINGE (ML) INTRAVENOUS ONCE
Status: COMPLETED | OUTPATIENT
Start: 2019-05-30 | End: 2019-05-30

## 2019-05-30 RX ORDER — BUPIVACAINE HYDROCHLORIDE AND EPINEPHRINE 5; 5 MG/ML; UG/ML
30 INJECTION, SOLUTION EPIDURAL; INTRACAUDAL; PERINEURAL ONCE
Status: COMPLETED | OUTPATIENT
Start: 2019-05-30 | End: 2019-05-30

## 2019-05-30 RX ORDER — HYDROMORPHONE HYDROCHLORIDE 1 MG/ML
.5-1 INJECTION, SOLUTION INTRAMUSCULAR; INTRAVENOUS; SUBCUTANEOUS
Status: DISCONTINUED | OUTPATIENT
Start: 2019-05-30 | End: 2019-05-31 | Stop reason: HOSPADM

## 2019-05-30 RX ORDER — GLYCOPYRROLATE 0.2 MG/ML
INJECTION INTRAMUSCULAR; INTRAVENOUS AS NEEDED
Status: DISCONTINUED | OUTPATIENT
Start: 2019-05-30 | End: 2019-05-30 | Stop reason: HOSPADM

## 2019-05-30 RX ORDER — ONDANSETRON 2 MG/ML
INJECTION INTRAMUSCULAR; INTRAVENOUS AS NEEDED
Status: DISCONTINUED | OUTPATIENT
Start: 2019-05-30 | End: 2019-05-30 | Stop reason: HOSPADM

## 2019-05-30 RX ADMIN — HYDROMORPHONE HYDROCHLORIDE 0.5 MG: 2 INJECTION, SOLUTION INTRAMUSCULAR; INTRAVENOUS; SUBCUTANEOUS at 13:22

## 2019-05-30 RX ADMIN — METOCLOPRAMIDE HYDROCHLORIDE 10 MG: 10 TABLET ORAL at 21:47

## 2019-05-30 RX ADMIN — DEXAMETHASONE SODIUM PHOSPHATE 8 MG: 4 INJECTION, SOLUTION INTRA-ARTICULAR; INTRALESIONAL; INTRAMUSCULAR; INTRAVENOUS; SOFT TISSUE at 12:47

## 2019-05-30 RX ADMIN — ACETAMINOPHEN 1000 MG: 10 INJECTION, SOLUTION INTRAVENOUS at 13:44

## 2019-05-30 RX ADMIN — FENTANYL CITRATE 100 MCG: 50 INJECTION, SOLUTION INTRAMUSCULAR; INTRAVENOUS at 12:46

## 2019-05-30 RX ADMIN — MIDAZOLAM HYDROCHLORIDE 2 MG: 1 INJECTION, SOLUTION INTRAMUSCULAR; INTRAVENOUS at 12:40

## 2019-05-30 RX ADMIN — PROPOFOL 150 MG: 10 INJECTION, EMULSION INTRAVENOUS at 12:46

## 2019-05-30 RX ADMIN — LIDOCAINE HYDROCHLORIDE 50 MG: 20 INJECTION, SOLUTION EPIDURAL; INFILTRATION; INTRACAUDAL; PERINEURAL at 12:46

## 2019-05-30 RX ADMIN — HYDROMORPHONE HYDROCHLORIDE 1 MG: 1 INJECTION, SOLUTION INTRAMUSCULAR; INTRAVENOUS; SUBCUTANEOUS at 19:27

## 2019-05-30 RX ADMIN — SODIUM CHLORIDE, SODIUM LACTATE, POTASSIUM CHLORIDE, AND CALCIUM CHLORIDE 25 ML/HR: 600; 310; 30; 20 INJECTION, SOLUTION INTRAVENOUS at 12:07

## 2019-05-30 RX ADMIN — IBUPROFEN 800 MG: 400 TABLET ORAL at 17:55

## 2019-05-30 RX ADMIN — ALBUTEROL SULFATE 3 PUFF: 90 AEROSOL, METERED RESPIRATORY (INHALATION) at 14:00

## 2019-05-30 RX ADMIN — SODIUM CHLORIDE, SODIUM LACTATE, POTASSIUM CHLORIDE, AND CALCIUM CHLORIDE: 600; 310; 30; 20 INJECTION, SOLUTION INTRAVENOUS at 13:37

## 2019-05-30 RX ADMIN — HYDROMORPHONE HYDROCHLORIDE 0.5 MG: 2 INJECTION, SOLUTION INTRAMUSCULAR; INTRAVENOUS; SUBCUTANEOUS at 14:04

## 2019-05-30 RX ADMIN — ROCURONIUM BROMIDE 10 MG: 10 INJECTION, SOLUTION INTRAVENOUS at 12:59

## 2019-05-30 RX ADMIN — FENTANYL CITRATE 25 MCG: 50 INJECTION, SOLUTION INTRAMUSCULAR; INTRAVENOUS at 14:45

## 2019-05-30 RX ADMIN — ALBUTEROL SULFATE 2 PUFF: 90 AEROSOL, METERED RESPIRATORY (INHALATION) at 12:40

## 2019-05-30 RX ADMIN — NEOSTIGMINE METHYLSULFATE 3 MG: 1 INJECTION INTRAVENOUS at 14:00

## 2019-05-30 RX ADMIN — SODIUM CHLORIDE 125 ML/HR: 900 INJECTION, SOLUTION INTRAVENOUS at 17:58

## 2019-05-30 RX ADMIN — ONDANSETRON 4 MG: 2 INJECTION INTRAMUSCULAR; INTRAVENOUS at 13:41

## 2019-05-30 RX ADMIN — ROCURONIUM BROMIDE 5 MG: 10 INJECTION, SOLUTION INTRAVENOUS at 12:46

## 2019-05-30 RX ADMIN — GLYCOPYRROLATE 0.4 MG: 0.2 INJECTION INTRAMUSCULAR; INTRAVENOUS at 14:00

## 2019-05-30 RX ADMIN — HYDROMORPHONE HYDROCHLORIDE 0.5 MG: 2 INJECTION, SOLUTION INTRAMUSCULAR; INTRAVENOUS; SUBCUTANEOUS at 13:33

## 2019-05-30 RX ADMIN — KETOROLAC TROMETHAMINE 30 MG: 30 INJECTION, SOLUTION INTRAMUSCULAR; INTRAVENOUS at 13:44

## 2019-05-30 RX ADMIN — Medication 10 ML: at 18:09

## 2019-05-30 RX ADMIN — Medication 2 G: at 12:52

## 2019-05-30 RX ADMIN — ROCURONIUM BROMIDE 25 MG: 10 INJECTION, SOLUTION INTRAVENOUS at 12:54

## 2019-05-30 RX ADMIN — SUCCINYLCHOLINE CHLORIDE 100 MG: 20 INJECTION INTRAMUSCULAR; INTRAVENOUS at 12:47

## 2019-05-30 RX ADMIN — SCOLOPAMINE TRANSDERMAL SYSTEM 1 PATCH: 1 PATCH, EXTENDED RELEASE TRANSDERMAL at 12:40

## 2019-05-30 RX ADMIN — ENOXAPARIN SODIUM 30 MG: 30 INJECTION SUBCUTANEOUS at 17:55

## 2019-05-30 RX ADMIN — SODIUM CHLORIDE 125 ML/HR: 900 INJECTION, SOLUTION INTRAVENOUS at 19:29

## 2019-05-30 RX ADMIN — Medication 10 ML: at 21:47

## 2019-05-30 RX ADMIN — HYDROMORPHONE HYDROCHLORIDE 1 MG: 1 INJECTION, SOLUTION INTRAMUSCULAR; INTRAVENOUS; SUBCUTANEOUS at 21:47

## 2019-05-30 RX ADMIN — ESCITALOPRAM OXALATE 10 MG: 10 TABLET ORAL at 21:47

## 2019-05-30 RX ADMIN — HYDROCODONE BITARTRATE AND ACETAMINOPHEN 1 TABLET: 5; 325 TABLET ORAL at 17:55

## 2019-05-30 NOTE — INTERVAL H&P NOTE
H&P Update:  Trisha Ramirez was seen and examined. History and physical has been reviewed. The patient has been examined.  There have been no significant clinical changes since the completion of the originally dated History and Physical.

## 2019-05-30 NOTE — PROGRESS NOTES
Patient tolerated supper without any GI distress. Patient ambulated to the bathroom with x 1 assist supervision assist only. Gait steady. Denies any dizziness with walking. Patient moved the speci hat that was placed in the commode to catch the urine for measurement. Urine noted in the commode.

## 2019-05-30 NOTE — PROGRESS NOTES
Pharmacy - Enoxaparin (Lovenox®) Monitoring      Indication: dvt prophylaxis     Current Dose: Enoxaparin 30 mg subcutaneously every 24 hours    Creatinine Clearance (mL/min): ~80 ml/min    Current Weight: 52.3 Kg    Labs:  No results for input(s): CREA, HGB, PLT, INR, HGBEXT, PLTEXT in the last 72 hours. No lab exists for component: INREXT  Wt Readings from Last 1 Encounters:   05/30/19 52.3 kg (115 lb 4.8 oz) (24 %, Z= -0.69)*       * Growth percentiles are based on CDC (Girls, 2-20 Years) data. Ht Readings from Last 1 Encounters:   05/30/19 152.4 cm (60\") (5 %, Z= -1.68)*       * Growth percentiles are based on CDC (Girls, 2-20 Years) data. Impression/Plan:   Lovenox 40 mg daily ordered, dose adjusted to 30 mg daily due to patient weight < 60 kg (52.3 kg)       Thanks,  Tariq Don, PHARMD      [email or web address] pdf

## 2019-05-30 NOTE — ANESTHESIA PREPROCEDURE EVALUATION
Anesthetic History     PONV          Review of Systems / Medical History  Patient summary reviewed, nursing notes reviewed and pertinent labs reviewed    Pulmonary          Smoker         Neuro/Psych         Psychiatric history    Comments: Anxiety/major depression Cardiovascular  Within defined limits                Exercise tolerance: >4 METS     GI/Hepatic/Renal     GERD: well controlled           Endo/Other  Within defined limits    Hypothyroidism       Other Findings   Comments: Denies pregnancy           Physical Exam    Airway  Mallampati: II  TM Distance: 4 - 6 cm  Neck ROM: normal range of motion   Mouth opening: Normal     Cardiovascular  Regular rate and rhythm,  S1 and S2 normal,  no murmur, click, rub, or gallop  Rhythm: regular  Rate: normal         Dental  No notable dental hx       Pulmonary        Wheezes:right         Abdominal  GI exam deferred       Other Findings            Anesthetic Plan    ASA: 2  Anesthesia type: general          Induction: Intravenous  Anesthetic plan and risks discussed with: Patient

## 2019-05-30 NOTE — OP NOTES
DATE OF PROCEDURE:  5/30/2019     PREOPERATIVE DIAGNOSIS:   Biliary Dyskinesia    POSTOPERATIVE DIAGNOSIS:   BILIARY DYSKINESIA     OPERATIVE PROCEDURE:  Laparoscopic cholecystectomy with cholangiogram, interpretation of cholangiogram (CPT 13350 [id number])    SURGEON:  Janey Medina MD    ANESTHESIA:  General endotracheal.    EBL: minimal    SPECIMENS:   ID Type Source Tests Collected by Time Destination   1 : GALL BLADDER Preservative Gallbladder  Liyah Sánchez MD 5/30/2019 1317 Pathology        FINDINGS:  Unremarkable gallbladder. No CBD filling defect on cholangiogram.    INDICATIONS:  RUQ and epigastric pain, nausea and vomiting. Abnormal gallbladder EF on HIDA with exact reproduction of post-prandial pain with CCK administration. DESCRIPTION OF PROCEDURE:  After obtaining informed consent, the patient was taken to the operating room and placed supine on the operating table. An operative time-out was performed, and general endotracheal anesthesia was induced. Preoperative antibiotics were administered, and the abdomen was prepped and draped in the usual sterile fashion. The abdomen was then entered just above the umbilicus using a 5-mm optical trocar. The abdomen was the insufflated without incident and was visually explored. There was no other visible pathology noted. Two right upper quadrant 5-mm ports were placed under direct vision, followed by a 12-mm port in the subxiphoid position. Local anesthetic was infiltrated at the port sites prior to placement. The gallbladder fundus was then grasped and retracted cephalad over the liver. The triangle of Calot was exposed, and the cystic duct and artery were skeletonized using a combination of blunt dissection with a [place] dissector and hook electrocautery. The cystic artery was then divided between clips with 2 clips left on the patient side.   The cystic duct was then traced from the gallbladder infundibulum into its insertion into the portal triad. The cystic duct was swept with a grasper up into the gallbladder. A clip was placed on the gallbladder infundibulum and an incision in the cystic duct adjacent to the gallbladder infundibulum was made with norm and the cholangiogram catheter was inserted and the cholangiogram was performed with the above noted findings. The cystic duct was then divided between clips directly adjacent to the gallbladder infundibulum, with 3 clips left on the patient's side. The gallbladder was then removed from the liver bed using hook electrocautery. It was removed from the abdominal cavity using a bag through the subxiphoid incision. The liver bed was inspected for hemostasis, and excellent hemostasis was achieved with minimal electrocautery. The clips on the cystic duct and artery were again visualized and were intact. The area below and lateral to the liver was suction irrigated until clear. The right upper quadrant ports were removed under direct vision and both were hemostatic. The abdomen was then desufflated through the subxiphoid port under direct vision via the umbilical port. These ports were subsequently removed and the fascial defect at the 12-mm incision was closed with an interrupted 0 Vicryl suture. The incisions were irrigated with sterile saline and made hemostatic with minimal electrocautery. They were closed with buried interrupted 4-0 Monocryl sutures, and dressed with sterile dressing. The patient was recovered from general anesthesia and taken to the recovery area in satisfactory condition. All instrument, sponge, and needle counts were reported as correct.     Lucretia Mitchell MD

## 2019-05-30 NOTE — PERIOP NOTES
Dr. Keiry Beyer aware of the k reading at 3.3 no additional lab needed for today. He is aware that ptis wheezing just a little upon his auscultation of  chest no treatment ordered.

## 2019-05-30 NOTE — PERIOP NOTES
TRANSFER - OUT REPORT:    Verbal report given to Padma Arce RN(name) on Cain Andrew  being transferred to ortho(unit) for routine post - op       Report consisted of patients Situation, Background, Assessment and   Recommendations(SBAR). Information from the following report(s) SBAR, Kardex, OR Summary and MAR was reviewed with the receiving nurse. Lines:   Peripheral IV 05/30/19 Distal;Left;Posterior Forearm (Active)   Site Assessment Clean, dry, & intact 5/30/2019  2:10 PM   Phlebitis Assessment 0 5/30/2019  2:10 PM   Infiltration Assessment 0 5/30/2019  2:10 PM   Dressing Status Clean, dry, & intact 5/30/2019  2:10 PM   Dressing Type Transparent 5/30/2019  2:10 PM   Hub Color/Line Status Pink; Infusing 5/30/2019  2:10 PM        Opportunity for questions and clarification was provided.       Patient transported with:   [employer]

## 2019-05-30 NOTE — PERIOP NOTES
Patient: Ankita Schaeffer MRN: [id number]  SSN: [id number]   YOB: 2000  Age: 22 y.o. Sex: female     Patient is status post Procedure(s):  LAPAROSCOPIC CHOLECYSTECTOMY WITH GRAMS. Surgeon(s) and Role:     * Almaz Marie MD - Primary    Local/Dose/Irrigation:  SEE [facility]                  Peripheral IV 05/30/19 Distal;Left;Posterior Forearm (Active)   Site Assessment Clean, dry, & intact 5/30/2019 12:00 PM   Phlebitis Assessment 0 5/30/2019 12:00 PM   Infiltration Assessment 0 5/30/2019 12:00 PM   Dressing Status Clean, dry, & intact 5/30/2019 12:00 PM   Dressing Type Tape;Transparent 5/30/2019 12:00 PM   Hub Color/Line Status Pink; Infusing 5/30/2019 12:00 PM            Airway - Endotracheal Tube 05/30/19 Oral (Active)   Line Carlos Lips 5/30/2019 12:00 AM                   Dressing/Packing:  Wound Abdomen-Dressing Type: Topical skin adhesive/glue(TO 4 TROCAR SITES) (05/30/19 1300)

## 2019-05-30 NOTE — PERIOP NOTES
Handoff Report from Operating Room to PACU    Report received from EMILIA Dunaway and KESHA Corrigan regarding [employer]. Surgeon(s):  Eze Long MD  And Procedure(s) (LRB):  LAPAROSCOPIC CHOLECYSTECTOMY WITH GRAMS (N/A)  confirmed   with allergies and dressings discussed. Anesthesia type, drugs, patient history, complications, estimated blood loss, vital signs, intake and output, and lines and temperature were reviewed.

## 2019-05-30 NOTE — ANESTHESIA POSTPROCEDURE EVALUATION
Procedure(s):  LAPAROSCOPIC CHOLECYSTECTOMY WITH GRAMS. general    Anesthesia Post Evaluation        Patient location during evaluation: PACU  Note status: Adequate. Level of consciousness: responsive to verbal stimuli and sleepy but conscious  Pain management: satisfactory to patient  Airway patency: patent  Anesthetic complications: no  Cardiovascular status: acceptable  Respiratory status: acceptable  Hydration status: acceptable  Comments: +Post-Anesthesia Evaluation and Assessment    Patient: Albert Francis MRN: [id number]  SSN: [id number]   YOB: 2000  Age: 22 y.o. Sex: female      Cardiovascular Function/Vital Signs    /59   Pulse 82   Temp 36.8 °C (98.2 °F)   Resp 12   Ht 5' (1.524 m)   Wt 52.3 kg (115 lb 4.8 oz)   SpO2 99%   BMI 22.52 kg/m²     Patient is status post Procedure(s):  LAPAROSCOPIC CHOLECYSTECTOMY WITH GRAMS. Nausea/Vomiting: Controlled. Postoperative hydration reviewed and adequate. Pain:  Pain Scale 1: Numeric (0 - 10) (05/30/19 1449)  Pain Intensity 1: 2(patient states pain is tolerable) (05/30/19 1449)   Managed. Neurological Status:   Neuro (WDL): Within Defined Limits (05/30/19 1410)   At baseline. Mental Status and Level of Consciousness: Arousable. Pulmonary Status:   O2 Device: Nasal cannula (05/30/19 1410)   Adequate oxygenation and airway patent. Complications related to anesthesia: None    Post-anesthesia assessment completed. No concerns. Signed By: Sondra Ravi MD    5/30/2019  Post anesthesia nausea and vomiting:  controlled      Vitals Value Taken Time   /57 5/30/2019  3:00 PM   Temp 36.8 °C (98.2 °F) 5/30/2019  2:10 PM   Pulse 79 5/30/2019  3:07 PM   Resp 14 5/30/2019  3:07 PM   SpO2 98 % 5/30/2019  3:07 PM   Vitals shown include unvalidated device data.

## 2019-05-31 VITALS
SYSTOLIC BLOOD PRESSURE: 122 MMHG | WEIGHT: 115.3 LBS | HEART RATE: 60 BPM | TEMPERATURE: 98.1 F | HEIGHT: 60 IN | RESPIRATION RATE: 18 BRPM | OXYGEN SATURATION: 96 % | BODY MASS INDEX: 22.64 KG/M2 | DIASTOLIC BLOOD PRESSURE: 75 MMHG

## 2019-05-31 PROCEDURE — 74011250636 HC RX REV CODE- 250/636: Performed by: SURGERY

## 2019-05-31 PROCEDURE — 99218 HC RM OBSERVATION: CPT

## 2019-05-31 PROCEDURE — 51798 US URINE CAPACITY MEASURE: CPT

## 2019-05-31 PROCEDURE — 74011250637 HC RX REV CODE- 250/637: Performed by: SURGERY

## 2019-05-31 RX ORDER — POLYETHYLENE GLYCOL 3350 17 G/17G
17 POWDER, FOR SOLUTION ORAL DAILY
Qty: 510 G | Refills: 0 | Status: SHIPPED | OUTPATIENT
Start: 2019-05-31 | End: 2019-06-28

## 2019-05-31 RX ORDER — IBUPROFEN 600 MG/1
600 TABLET ORAL
Qty: 21 TAB | Refills: 0 | Status: SHIPPED | OUTPATIENT
Start: 2019-05-31 | End: 2019-07-02

## 2019-05-31 RX ORDER — HYDROCODONE BITARTRATE AND ACETAMINOPHEN 5; 325 MG/1; MG/1
1-2 TABLET ORAL
Qty: 30 TAB | Refills: 0 | Status: SHIPPED | OUTPATIENT
Start: 2019-05-31 | End: 2019-06-05

## 2019-05-31 RX ADMIN — PANTOPRAZOLE SODIUM 40 MG: 40 TABLET, DELAYED RELEASE ORAL at 09:25

## 2019-05-31 RX ADMIN — HYDROCODONE BITARTRATE AND ACETAMINOPHEN 1 TABLET: 5; 325 TABLET ORAL at 00:46

## 2019-05-31 RX ADMIN — SODIUM CHLORIDE 125 ML/HR: 900 INJECTION, SOLUTION INTRAVENOUS at 05:30

## 2019-05-31 RX ADMIN — METOCLOPRAMIDE HYDROCHLORIDE 10 MG: 10 TABLET ORAL at 09:25

## 2019-05-31 RX ADMIN — HYDROCODONE BITARTRATE AND ACETAMINOPHEN 1 TABLET: 5; 325 TABLET ORAL at 09:24

## 2019-05-31 RX ADMIN — HYDROCODONE BITARTRATE AND ACETAMINOPHEN 1 TABLET: 5; 325 TABLET ORAL at 04:41

## 2019-05-31 RX ADMIN — IBUPROFEN 800 MG: 400 TABLET ORAL at 09:25

## 2019-05-31 RX ADMIN — Medication 10 ML: at 04:42

## 2019-05-31 RX ADMIN — POTASSIUM CHLORIDE 10 MEQ: 750 TABLET, EXTENDED RELEASE ORAL at 09:25

## 2019-05-31 RX ADMIN — POLYETHYLENE GLYCOL 3350 17 G: 17 POWDER, FOR SOLUTION ORAL at 09:24

## 2019-05-31 NOTE — ROUTINE PROCESS
Pt given education regarding discharge instructions, prescription. Opportunities for questions given. PIV taken out with cath tip intact. pt discharged home with personal belongings and mother.

## 2019-05-31 NOTE — PROGRESS NOTES
Bedside and Verbal shift change report given to Brandon Sultana (oncoming nurse) by Elizabeth William (offgoing nurse). Report included the following information SBAR, Kardex, Intake/Output, MAR and Recent Results.

## 2019-05-31 NOTE — DISCHARGE INSTRUCTIONS
Discharge Instructions:  Laparoscopic Cholecystectomy (Gallbladder Removal)  Dr. Mariela Fitzgerald    Call on next business day to arrange appointment for follow up in 3 weeks -- [phone]. Activity:  Walk regularly - can walk today as tolerated, but make yourself walk at least 50 yards at least 6 times per day starting tomorrow. No lifting more than 10 -15 pounds for 4 weeks. You may resume driving in 5-7 days unless still requiring narcotics for pain. Work:  You may return to work in 1 or 2 weeks to light activity. No lifting more than 10 pounds (=\"light duty\") for four weeks. If your employer can not accommodate \"light duty,\" your employer will need to provide any necessary paperwork to our office. This document with serve as the initial \"note\" to your employer. Diet:  You may resume normal diet after 24 hours. Fatty foods may still cause some stomach upset. Avoid fatty/greasy foods for 1 month, then add back slowly. Wound Care:  Dermabond glue dressing will fall off over the next couple of weeks. It is waterproof. You may shower, but no swimming or baths for 2 weeks. Your incisions may ooze for a few days. Do not worry about this. If you experience a lot of drainage, develop redness around the wound, or a fever over 101 F occurs please call the office. Medications:  See attached \"Medication Reconciliation. \"    Resume home medications as indicated on the Medical Reconciliation form. Do not use blood thinners (such as Aspirin, Coumadin, or Plavix) until 3 days after surgery. Pain medications:  Non steroidal antiinflammatories (ibuprofen -- Advil or Motrin) seem to work best for post surgical pain. Try these first.  A narcotic prescription will also be given for breakthrough pain. Do not use Tylenol while taking the narcotic because there is Tylenol in the narcotic pill, and too much Tylenol can injure your liver.        PUT ICE ON YOUR INCISIONS 20 MINUTES EVERY HOUR WHILE THEY REMAIN SORE. PLACE A CLOTH BETWEEN YOUR SKIN THE THE ICE BAG. Colace or Miralax should be used twice daily to prevent constipation while on narcotics. If you are still having trouble having a BM after 1-2 days, try milk of magnesia. If this does not work within 24 hours, try a bottle of magnesium citrate. If this does not work, call us. Narcotics and anesthesia sometimes cause nausea and vomiting. If persistent please call the office. Do not hesitate to call with questions or concerns. Renee Harvey MD  [facility].   Tel. [phone]  Fax [phone]

## 2019-05-31 NOTE — PROGRESS NOTES
5/31/2019  07:30 AM    Orthopedic End of Shift Note    Bedside and Verbal shift change report given to Mike Garcia (oncoming nurse) by Conner Pradhan RN (offgoing nurse). Report included the following information SBAR, Kardex, OR Summary, Procedure Summary, Intake/Output, MAR, Accordion, Recent Results and Med Rec Status. POD# 1  Significant issues during shift: Bladder spasms have appeared to resolve. Issues for Physician to address:      Activity This Shift  (check all that apply) [x] chair  [] dangle   [x] bathroom  [] bedside commode [] hallway  [] bedrest   Nausea/Vomiting [] yes [] no     Voiding Status [x] void [] Dior [] I&O Cath   Bowel Movements [] yes [x] no     Foot Pumps or SCD [x] yes [] no    Ice Pack [x] yes    [] no    Incentive Spirometer [x] yes [] no Volume:   1000   Telemetry Monitoring   [] yes [x] no Rhythm:   Supplemental O2 [] yes [x] no Sat off O2:   97%

## 2019-06-03 ENCOUNTER — PATIENT OUTREACH (OUTPATIENT)
Dept: OTHER | Age: 19
End: 2019-06-03

## 2019-06-03 NOTE — PROGRESS NOTES
Telephonic outreach to follow up with patient, released from the hospital following surgery, Laparoscopic Cholecystectomy. Unable to leave a message as VM is full. Will attempt to outreach patient tomorrow.

## 2019-06-04 ENCOUNTER — PATIENT OUTREACH (OUTPATIENT)
Dept: OTHER | Age: 19
End: 2019-06-04

## 2019-06-04 NOTE — PROGRESS NOTES
Telephonic outreach attempt to follow up with patient following discharge. Unable to leave a VM, as it is full. Will continue to attempt to reach the patient.

## 2019-06-17 ENCOUNTER — OFFICE VISIT (OUTPATIENT)
Dept: SURGERY | Age: 19
End: 2019-06-17

## 2019-06-17 VITALS
RESPIRATION RATE: 16 BRPM | OXYGEN SATURATION: 98 % | TEMPERATURE: 99.1 F | DIASTOLIC BLOOD PRESSURE: 74 MMHG | HEIGHT: 60 IN | SYSTOLIC BLOOD PRESSURE: 124 MMHG | BODY MASS INDEX: 23.75 KG/M2 | WEIGHT: 121 LBS | HEART RATE: 69 BPM

## 2019-06-17 DIAGNOSIS — Z09 POSTOPERATIVE EXAMINATION: Primary | ICD-10-CM

## 2019-06-17 RX ORDER — METOCLOPRAMIDE 5 MG/1
TABLET ORAL
Refills: 3 | COMMUNITY
Start: 2019-06-01 | End: 2019-07-02 | Stop reason: DRUGHIGH

## 2019-06-17 NOTE — PROGRESS NOTES
Chief Complaint   Patient presents with    Surgical Follow-up     GB surgery 5/30/19     1. Have you been to the ER, urgent care clinic since your last visit? Hospitalized since your last visit? No    2. Have you seen or consulted any other health care providers outside of the [facility] since your last visit? Include any pap smears or colon screening.  No

## 2019-06-17 NOTE — Clinical Note
6/17/19 Patient: Marce Freedman YOB: 2000 Date of Visit: 6/17/2019 Jaden Evangelista MD 
[facility] [address] VIA Facsimile: [phone] Dear Jaden Evangelista MD, Thank you for referring Ms. Kassy Sharpe to [address] [place] [address] for evaluation. My notes for this consultation are attached. If you have questions, please do not hesitate to call me. I look forward to following your patient along with you.  
 
 
Sincerely, 
 
Chaka Malone MD

## 2019-06-17 NOTE — PROGRESS NOTES
To: Félix Bergman MD, Valerio Dancer, MD  From: Jared Graham MD    Thank you for referring Dustin Brady. Encounter Date: 6/17/2019    Subjective:      Ankita Schaeffer is a 22 y.o. female presents for postop care. Has no complaints today. Preoperative symptoms are resolved. No more nausea. Eating a regular diet without difficulty. Has even gained a few pounds. Bowel movements are regular. Objective:     General:  alert, cooperative, no distress, appears stated age   Abdomen: soft, bowel sounds active, non-tender   Incision(s):  healing well, no drainage, no erythema, no hernia, no seroma, no swelling, no dehiscence, incision well approximated. Assessment:     S/p laparoscopic cholecystectomy. Pathology revealed mild chronic cholecystitis. No CBD filling defects on intraoperative cholangiogram.   Doing well postoperatively. Plan:     Patient understands no further follow-up required. Told to call for any concerns.       Jared Graham MD

## 2019-06-28 ENCOUNTER — HOSPITAL ENCOUNTER (EMERGENCY)
Age: 19
Discharge: HOME | End: 2019-06-29
Attending: EMERGENCY MEDICINE
Payer: COMMERCIAL

## 2019-06-28 ENCOUNTER — APPOINTMENT (OUTPATIENT)
Dept: CT IMAGING | Age: 19
End: 2019-06-28
Attending: EMERGENCY MEDICINE
Payer: COMMERCIAL

## 2019-06-28 VITALS
OXYGEN SATURATION: 96 % | SYSTOLIC BLOOD PRESSURE: 133 MMHG | RESPIRATION RATE: 16 BRPM | BODY MASS INDEX: 22.14 KG/M2 | HEIGHT: 61 IN | HEART RATE: 70 BPM | WEIGHT: 117.28 LBS | DIASTOLIC BLOOD PRESSURE: 96 MMHG

## 2019-06-28 DIAGNOSIS — Z72.0 TOBACCO ABUSE: ICD-10-CM

## 2019-06-28 DIAGNOSIS — N39.0 URINARY TRACT INFECTION WITHOUT HEMATURIA, SITE UNSPECIFIED: ICD-10-CM

## 2019-06-28 DIAGNOSIS — K52.9 GASTROENTERITIS, ACUTE: ICD-10-CM

## 2019-06-28 DIAGNOSIS — R10.9 ACUTE ABDOMINAL PAIN: ICD-10-CM

## 2019-06-28 DIAGNOSIS — D72.829 LEUKOCYTOSIS, UNSPECIFIED TYPE: ICD-10-CM

## 2019-06-28 DIAGNOSIS — R11.10 ACUTE VOMITING: Primary | ICD-10-CM

## 2019-06-28 DIAGNOSIS — F12.10 TETRAHYDROCANNABINOL (THC) USE DISORDER, MILD, ABUSE: ICD-10-CM

## 2019-06-28 DIAGNOSIS — K52.9 ENTEROCOLITIS: ICD-10-CM

## 2019-06-28 LAB
ALBUMIN SERPL-MCNC: 4.7 G/DL (ref 3.5–5)
ALBUMIN/GLOB SERPL: 1.4 {RATIO} (ref 1.1–2.2)
ALP SERPL-CCNC: 70 U/L (ref 45–117)
ALT SERPL-CCNC: 27 U/L (ref 12–78)
ANION GAP SERPL CALC-SCNC: 8 MMOL/L (ref 5–15)
AST SERPL-CCNC: 18 U/L (ref 15–37)
BASOPHILS # BLD: 0 K/UL (ref 0–0.1)
BASOPHILS NFR BLD: 0 % (ref 0–1)
BILIRUB SERPL-MCNC: 1.1 MG/DL (ref 0.2–1)
BUN SERPL-MCNC: 17 MG/DL (ref 6–20)
BUN/CREAT SERPL: 23 (ref 12–20)
CALCIUM SERPL-MCNC: 9.3 MG/DL (ref 8.5–10.1)
CHLORIDE SERPL-SCNC: 104 MMOL/L (ref 97–108)
CO2 SERPL-SCNC: 26 MMOL/L (ref 21–32)
CREAT SERPL-MCNC: 0.75 MG/DL (ref 0.55–1.02)
DIFFERENTIAL METHOD BLD: ABNORMAL
EOSINOPHIL # BLD: 1.2 K/UL (ref 0–0.4)
EOSINOPHIL NFR BLD: 7 % (ref 0–7)
ERYTHROCYTE [DISTWIDTH] IN BLOOD BY AUTOMATED COUNT: 13 % (ref 11.5–14.5)
ETHANOL SERPL-MCNC: <10 MG/DL
GLOBULIN SER CALC-MCNC: 3.4 G/DL (ref 2–4)
GLUCOSE SERPL-MCNC: 142 MG/DL (ref 65–100)
HCT VFR BLD AUTO: 41.7 % (ref 35–47)
HGB BLD-MCNC: 14.5 G/DL (ref 11.5–16)
IMM GRANULOCYTES # BLD AUTO: 0 K/UL (ref 0–0.04)
IMM GRANULOCYTES NFR BLD AUTO: 0 % (ref 0–0.5)
LIPASE SERPL-CCNC: 71 U/L (ref 73–393)
LYMPHOCYTES # BLD: 2.2 K/UL (ref 0.8–3.5)
LYMPHOCYTES NFR BLD: 13 % (ref 12–49)
MCH RBC QN AUTO: 33.9 PG (ref 26–34)
MCHC RBC AUTO-ENTMCNC: 34.8 G/DL (ref 30–36.5)
MCV RBC AUTO: 97.4 FL (ref 80–99)
MONOCYTES # BLD: 0.2 K/UL (ref 0–1)
MONOCYTES NFR BLD: 1 % (ref 5–13)
NEUTS BAND NFR BLD MANUAL: 3 %
NEUTS SEG # BLD: 13.5 K/UL (ref 1.8–8)
NEUTS SEG NFR BLD: 76 % (ref 32–75)
NRBC # BLD: 0 K/UL (ref 0–0.01)
NRBC BLD-RTO: 0 PER 100 WBC
PLATELET # BLD AUTO: 362 K/UL (ref 150–400)
PMV BLD AUTO: 10.2 FL (ref 8.9–12.9)
POTASSIUM SERPL-SCNC: 3.4 MMOL/L (ref 3.5–5.1)
PROT SERPL-MCNC: 8.1 G/DL (ref 6.4–8.2)
RBC # BLD AUTO: 4.28 M/UL (ref 3.8–5.2)
SODIUM SERPL-SCNC: 138 MMOL/L (ref 136–145)
WBC # BLD AUTO: 17.1 K/UL (ref 3.6–11)

## 2019-06-28 PROCEDURE — 85025 COMPLETE CBC W/AUTO DIFF WBC: CPT

## 2019-06-28 PROCEDURE — 81001 URINALYSIS AUTO W/SCOPE: CPT

## 2019-06-28 PROCEDURE — 96361 HYDRATE IV INFUSION ADD-ON: CPT

## 2019-06-28 PROCEDURE — 99283 EMERGENCY DEPT VISIT LOW MDM: CPT

## 2019-06-28 PROCEDURE — 80307 DRUG TEST PRSMV CHEM ANLYZR: CPT

## 2019-06-28 PROCEDURE — 96374 THER/PROPH/DIAG INJ IV PUSH: CPT

## 2019-06-28 PROCEDURE — 74177 CT ABD & PELVIS W/CONTRAST: CPT

## 2019-06-28 PROCEDURE — 83690 ASSAY OF LIPASE: CPT

## 2019-06-28 PROCEDURE — 74011250636 HC RX REV CODE- 250/636: Performed by: EMERGENCY MEDICINE

## 2019-06-28 PROCEDURE — 87086 URINE CULTURE/COLONY COUNT: CPT

## 2019-06-28 PROCEDURE — 96375 TX/PRO/DX INJ NEW DRUG ADDON: CPT

## 2019-06-28 PROCEDURE — 36415 COLL VENOUS BLD VENIPUNCTURE: CPT

## 2019-06-28 PROCEDURE — 80053 COMPREHEN METABOLIC PANEL: CPT

## 2019-06-28 RX ORDER — ONDANSETRON 2 MG/ML
4 INJECTION INTRAMUSCULAR; INTRAVENOUS
Status: COMPLETED | OUTPATIENT
Start: 2019-06-28 | End: 2019-06-28

## 2019-06-28 RX ORDER — METOCLOPRAMIDE HYDROCHLORIDE 5 MG/ML
10 INJECTION INTRAMUSCULAR; INTRAVENOUS
Status: COMPLETED | OUTPATIENT
Start: 2019-06-28 | End: 2019-06-28

## 2019-06-28 RX ORDER — SODIUM CHLORIDE 0.9 % (FLUSH) 0.9 %
10 SYRINGE (ML) INJECTION
Status: COMPLETED | OUTPATIENT
Start: 2019-06-28 | End: 2019-06-29

## 2019-06-28 RX ORDER — FAMOTIDINE 20 MG/1
20 TABLET, FILM COATED ORAL 2 TIMES DAILY
COMMUNITY

## 2019-06-28 RX ORDER — FENTANYL CITRATE 50 UG/ML
50 INJECTION, SOLUTION INTRAMUSCULAR; INTRAVENOUS
Status: COMPLETED | OUTPATIENT
Start: 2019-06-28 | End: 2019-06-28

## 2019-06-28 RX ORDER — HALOPERIDOL 5 MG/ML
5 INJECTION INTRAMUSCULAR ONCE
Status: COMPLETED | OUTPATIENT
Start: 2019-06-28 | End: 2019-06-29

## 2019-06-28 RX ADMIN — ONDANSETRON 4 MG: 2 INJECTION INTRAMUSCULAR; INTRAVENOUS at 22:40

## 2019-06-28 RX ADMIN — FENTANYL CITRATE 50 MCG: 50 INJECTION, SOLUTION INTRAMUSCULAR; INTRAVENOUS at 22:46

## 2019-06-28 RX ADMIN — SODIUM CHLORIDE 1000 ML: 900 INJECTION, SOLUTION INTRAVENOUS at 22:39

## 2019-06-28 RX ADMIN — METOCLOPRAMIDE 10 MG: 5 INJECTION, SOLUTION INTRAMUSCULAR; INTRAVENOUS at 22:46

## 2019-06-29 LAB
AMPHET UR QL SCN: NEGATIVE
APPEARANCE UR: ABNORMAL
BACTERIA URNS QL MICRO: ABNORMAL /HPF
BARBITURATES UR QL SCN: NEGATIVE
BENZODIAZ UR QL: NEGATIVE
BILIRUB UR QL: NEGATIVE
CANNABINOIDS UR QL SCN: POSITIVE
COCAINE UR QL SCN: NEGATIVE
COLOR UR: ABNORMAL
DRUG SCRN COMMENT,DRGCM: ABNORMAL
EPITH CASTS URNS QL MICRO: ABNORMAL /LPF
GLUCOSE UR STRIP.AUTO-MCNC: NEGATIVE MG/DL
HGB UR QL STRIP: ABNORMAL
KETONES UR QL STRIP.AUTO: 40 MG/DL
LEUKOCYTE ESTERASE UR QL STRIP.AUTO: ABNORMAL
METHADONE UR QL: NEGATIVE
NITRITE UR QL STRIP.AUTO: NEGATIVE
OPIATES UR QL: NEGATIVE
PCP UR QL: NEGATIVE
PH UR STRIP: 6 [PH] (ref 5–8)
PROT UR STRIP-MCNC: NEGATIVE MG/DL
RBC #/AREA URNS HPF: ABNORMAL /HPF (ref 0–5)
SP GR UR REFRACTOMETRY: 1.03 (ref 1–1.03)
UA: UC IF INDICATED,UAUC: ABNORMAL
UROBILINOGEN UR QL STRIP.AUTO: 1 EU/DL (ref 0.2–1)
WBC URNS QL MICRO: ABNORMAL /HPF (ref 0–4)

## 2019-06-29 PROCEDURE — 74011250636 HC RX REV CODE- 250/636: Performed by: EMERGENCY MEDICINE

## 2019-06-29 PROCEDURE — 96375 TX/PRO/DX INJ NEW DRUG ADDON: CPT

## 2019-06-29 PROCEDURE — 74011636320 HC RX REV CODE- 636/320: Performed by: EMERGENCY MEDICINE

## 2019-06-29 PROCEDURE — 96361 HYDRATE IV INFUSION ADD-ON: CPT

## 2019-06-29 PROCEDURE — 74011000258 HC RX REV CODE- 258: Performed by: EMERGENCY MEDICINE

## 2019-06-29 RX ORDER — CEPHALEXIN 500 MG/1
500 CAPSULE ORAL 4 TIMES DAILY
Qty: 28 CAP | Refills: 0 | Status: SHIPPED | OUTPATIENT
Start: 2019-06-29 | End: 2019-07-06

## 2019-06-29 RX ORDER — PROMETHAZINE HYDROCHLORIDE 25 MG/1
25 TABLET ORAL
Qty: 12 TAB | Refills: 0 | Status: SHIPPED | OUTPATIENT
Start: 2019-06-29 | End: 2019-07-02

## 2019-06-29 RX ORDER — CEFTRIAXONE 1 G/1
INJECTION, POWDER, FOR SOLUTION INTRAMUSCULAR; INTRAVENOUS
Status: DISCONTINUED
Start: 2019-06-29 | End: 2019-06-29 | Stop reason: HOSPADM

## 2019-06-29 RX ORDER — METRONIDAZOLE 500 MG/1
500 TABLET ORAL 2 TIMES DAILY
Qty: 14 TAB | Refills: 0 | Status: SHIPPED | OUTPATIENT
Start: 2019-06-29 | End: 2019-07-06

## 2019-06-29 RX ORDER — DICYCLOMINE HYDROCHLORIDE 20 MG/1
20 TABLET ORAL EVERY 6 HOURS
Qty: 20 TAB | Refills: 0 | Status: SHIPPED | OUTPATIENT
Start: 2019-06-29 | End: 2019-07-02

## 2019-06-29 RX ADMIN — Medication 10 ML: at 00:02

## 2019-06-29 RX ADMIN — IOPAMIDOL 100 ML: 755 INJECTION, SOLUTION INTRAVENOUS at 00:02

## 2019-06-29 RX ADMIN — CEFTRIAXONE 1 G: 1 INJECTION, POWDER, FOR SOLUTION INTRAMUSCULAR; INTRAVENOUS at 01:02

## 2019-06-29 RX ADMIN — HALOPERIDOL LACTATE 5 MG: 5 INJECTION INTRAMUSCULAR at 00:12

## 2019-06-29 NOTE — ED PROVIDER NOTES
EMERGENCY DEPARTMENT HISTORY AND PHYSICAL EXAM      Date: 6/28/2019  Patient Name: Amrita Roth  Patient Age and Sex: 22 y.o. female    History of Presenting Illness     Chief Complaint   Patient presents with    Vomiting     lap gigi x1 month ago; vomiting since this morning; has taken zofran, reglan, protonix, pepcid       History Provided By: Patient    HPI: Amrita Roth, 22 y.o. female with past medical history as documented below presents to the ED with c/o of acute vomiting since 2 am. She reports having at least 5 episodes, non-bloody and non-bilious emesis. Pt reports having severe intensity 9/10 abdominal pain in the epigastric area. Pt reports having a lap cholecystectomy a month ago and had been doing well until tonight. She reports taking her home Zofran, Reglan, Protonix, and Pepcid with minimal relief of sx's. She does admit to marijuana use tonight PTA. Pt denies any other alleviating or exacerbating factors. Additionally, pt specifically denies any recent fever, chills, headache, CP, SOB, lightheadedness, dizziness, numbness, weakness, BLE swelling, heart palpitations, urinary sxs, diarrhea, constipation, melena, hematochezia, cough, or congestion. PCP: Latanya Garcia MD    There are no other complaints, changes or physical findings at this time.      Past History   Past Medical History:  Past Medical History:   Diagnosis Date    Major depressive disorder 4/25/2019    Psychiatric disorder 2019    Anxiety       Past Surgical History:  Past Surgical History:   Procedure Laterality Date    COLONOSCOPY N/A 5/14/2019    COLONOSCOPY performed by Prema Zamudio [facility] at [facility] ENDOSCOPY    COLONOSCOPY,DIAGNOSTIC  5/14/2019         HX CHOLECYSTECTOMY  05/30/2019    HX HEENT      wisdom teeth    UPPER GI ENDOSCOPY,BIOPSY  11/16/2018            Family History:  Family History   Problem Relation Age of Onset    Other Mother         divericulitis, gallbladder removal    No Known Problems Father    Inidana Zuleta Crohn's Disease Maternal Grandmother 48    Other Maternal Grandmother         divericulitis and gallbladder removal    Stroke Maternal Grandfather     Hypertension Maternal Grandfather     High Cholesterol Maternal Grandfather     Cataract Maternal Grandfather     Stroke Paternal Grandmother     Cancer Paternal Grandmother 75        kidney, breast---age 61    No Known Problems Paternal Grandfather        Social History:  Social History     Tobacco Use    Smoking status: Current Some Day Smoker     Packs/day: 1.00     Years: 1.00     Pack years: 1.00    Smokeless tobacco: Never Used    Tobacco comment: Per pt, \"occasionally\"   Substance Use Topics    Alcohol use: Yes     Comment: Per pt, \"rarely\"    Drug use: Yes     Frequency: 7.0 times per week     Types: Marijuana, Prescription, OTC     Comment: usually every other day       Allergies:  No Known Allergies    Current Medications:  No current facility-administered medications on file prior to encounter. Current Outpatient Medications on File Prior to Encounter   Medication Sig Dispense Refill    famotidine (PEPCID) 20 mg tablet Take 20 mg by mouth two (2) times a day.  metoclopramide HCl (REGLAN) 5 mg tablet TAKE 1 TABLET BY MOUTH FOUR TIMES A DAY BEFORE MEALS AND AT BEDTIME  3    ibuprofen (MOTRIN) 600 mg tablet Take 1 Tab by mouth three (3) times daily (with meals). 21 Tab 0    metoclopramide HCl (REGLAN) 10 mg tablet Take 10 mg by mouth Before breakfast, lunch, dinner and at bedtime.  pantoprazole (PROTONIX) 40 mg tablet Take 1 Tab by mouth daily. 42 Tab 1    escitalopram oxalate (LEXAPRO) 10 mg tablet Take 1 Tab by mouth daily. Indications: major depressive disorder (Patient taking differently: Take 15 mg by mouth daily. Indications: major depressive disorder) 30 Tab 0    levonorgestrel (MIRENA) 20 mcg/24 hr (5 years) IUD 1 Each by IntraUTERine route once. Review of Systems   Review of Systems   Constitutional: Negative. Negative for chills and fever. HENT: Negative. Negative for congestion, facial swelling, rhinorrhea, sore throat, trouble swallowing and voice change. Eyes: Negative. Respiratory: Negative. Negative for apnea, cough, chest tightness, shortness of breath and wheezing. Cardiovascular: Negative. Negative for chest pain, palpitations and leg swelling. Gastrointestinal: Positive for abdominal pain and vomiting. Negative for abdominal distention, blood in stool, constipation, diarrhea and nausea. Endocrine: Negative. Negative for cold intolerance, heat intolerance and polyuria. Genitourinary: Negative. Negative for difficulty urinating, dysuria, flank pain, frequency, hematuria and urgency. Musculoskeletal: Negative. Negative for arthralgias, back pain, myalgias, neck pain and neck stiffness. Skin: Negative. Negative for color change and rash. Neurological: Negative. Negative for dizziness, syncope, facial asymmetry, speech difficulty, weakness, light-headedness, numbness and headaches. Hematological: Negative. Does not bruise/bleed easily. Psychiatric/Behavioral: Negative. Negative for confusion and self-injury. The patient is not nervous/anxious. Physical Exam   Physical Exam   Constitutional: She is oriented to person, place, and time. She appears well-developed and well-nourished. No distress. HENT:   Head: Normocephalic and atraumatic. Mouth/Throat: Oropharynx is clear and moist. No oropharyngeal exudate. Eyes: Pupils are equal, round, and reactive to light. Conjunctivae and EOM are normal.   Neck: Normal range of motion. Cardiovascular: Normal rate, regular rhythm and normal heart sounds. Exam reveals no gallop and no friction rub. No murmur heard. Pulmonary/Chest: Effort normal and breath sounds normal. No respiratory distress. She has no wheezes. She has no rales. She exhibits no tenderness. Abdominal: Soft.  Bowel sounds are normal. She exhibits no distension and no mass. There is no tenderness. There is no rebound and no guarding. Musculoskeletal: Normal range of motion. She exhibits no edema, tenderness or deformity. Neurological: She is alert and oriented to person, place, and time. She displays normal reflexes. No cranial nerve deficit. She exhibits normal muscle tone. Coordination normal.   Skin: Skin is warm. No rash noted. She is not diaphoretic. Psychiatric: She has a normal mood and affect. Nursing note and vitals reviewed. Diagnostic Study Results     Labs -  Recent Results (from the past 24 hour(s))   CBC WITH AUTOMATED DIFF    Collection Time: 06/28/19 10:01 PM   Result Value Ref Range    WBC 17.1 (H) 3.6 - 11.0 K/uL    RBC 4.28 3.80 - 5.20 M/uL    HGB 14.5 11.5 - 16.0 g/dL    HCT 41.7 35.0 - 47.0 %    MCV 97.4 80.0 - 99.0 FL    MCH 33.9 26.0 - 34.0 PG    MCHC 34.8 30.0 - 36.5 g/dL    RDW 13.0 11.5 - 14.5 %    PLATELET 682 197 - 060 K/uL    MPV 10.2 8.9 - 12.9 FL    NRBC 0.0 0  WBC    ABSOLUTE NRBC 0.00 0.00 - 0.01 K/uL    NEUTROPHILS 76 (H) 32 - 75 %    BAND NEUTROPHILS 3 %    LYMPHOCYTES 13 12 - 49 %    MONOCYTES 1 (L) 5 - 13 %    EOSINOPHILS 7 0 - 7 %    BASOPHILS 0 0 - 1 %    IMMATURE GRANULOCYTES 0 0.0 - 0.5 %    ABS. NEUTROPHILS 13.5 (H) 1.8 - 8.0 K/UL    ABS. LYMPHOCYTES 2.2 0.8 - 3.5 K/UL    ABS. MONOCYTES 0.2 0.0 - 1.0 K/UL    ABS. EOSINOPHILS 1.2 (H) 0.0 - 0.4 K/UL    ABS. BASOPHILS 0.0 0.0 - 0.1 K/UL    ABS. IMM.  GRANS. 0.0 0.00 - 0.04 K/UL    DF MANUAL     LIPASE    Collection Time: 06/28/19 10:01 PM   Result Value Ref Range    Lipase 71 (L) 73 - 393 U/L   ETHYL ALCOHOL    Collection Time: 06/28/19 10:01 PM   Result Value Ref Range    ALCOHOL(ETHYL),SERUM <10 <95 MG/DL   METABOLIC PANEL, COMPREHENSIVE    Collection Time: 06/28/19 10:01 PM   Result Value Ref Range    Sodium 138 136 - 145 mmol/L    Potassium 3.4 (L) 3.5 - 5.1 mmol/L    Chloride 104 97 - 108 mmol/L    CO2 26 21 - 32 mmol/L    Anion gap 8 5 - 15 mmol/L Glucose 142 (H) 65 - 100 mg/dL    BUN 17 6 - 20 MG/DL    Creatinine 0.75 0.55 - 1.02 MG/DL    BUN/Creatinine ratio 23 (H) 12 - 20      GFR est AA >60 >60 ml/min/1.73m2    GFR est non-AA >60 >60 ml/min/1.73m2    Calcium 9.3 8.5 - 10.1 MG/DL    Bilirubin, total 1.1 (H) 0.2 - 1.0 MG/DL    ALT (SGPT) 27 12 - 78 U/L    AST (SGOT) 18 15 - 37 U/L    Alk. phosphatase 70 45 - 117 U/L    Protein, total 8.1 6.4 - 8.2 g/dL    Albumin 4.7 3.5 - 5.0 g/dL    Globulin 3.4 2.0 - 4.0 g/dL    A-G Ratio 1.4 1.1 - 2.2     URINALYSIS W/ REFLEX CULTURE    Collection Time: 06/28/19 11:35 PM   Result Value Ref Range    Color YELLOW/STRAW      Appearance CLOUDY (A) CLEAR      Specific gravity 1.026 1.003 - 1.030      pH (UA) 6.0 5.0 - 8.0      Protein NEGATIVE  NEG mg/dL    Glucose NEGATIVE  NEG mg/dL    Ketone 40 (A) NEG mg/dL    Bilirubin NEGATIVE  NEG      Blood MODERATE (A) NEG      Urobilinogen 1.0 0.2 - 1.0 EU/dL    Nitrites NEGATIVE  NEG      Leukocyte Esterase SMALL (A) NEG      WBC 20-50 0 - 4 /hpf    RBC 0-5 0 - 5 /hpf    Epithelial cells MODERATE (A) FEW /lpf    Bacteria 3+ (A) NEG /hpf    UA:UC IF INDICATED URINE CULTURE ORDERED (A) CNI     DRUG SCREEN, URINE    Collection Time: 06/28/19 11:35 PM   Result Value Ref Range    AMPHETAMINES NEGATIVE  NEG      BARBITURATES NEGATIVE  NEG      BENZODIAZEPINES NEGATIVE  NEG      COCAINE NEGATIVE  NEG      METHADONE NEGATIVE  NEG      OPIATES NEGATIVE  NEG      PCP(PHENCYCLIDINE) NEGATIVE  NEG      THC (TH-CANNABINOL) POSITIVE (A) NEG      Drug screen comment (NOTE)        Radiologic Studies -   CT ABD PELV W CONT   Final Result   IMPRESSION:   Findings consistent with an enterocolitis. Consider infectious/inflammatory   etiology. Post cholecystectomy with minimal fluid in the gallbladder fossa. Typical for   the postprocedural setting.         CT Results  (Last 48 hours)               06/29/19 0000  CT ABD PELV W CONT Final result    Impression:  IMPRESSION:   Findings consistent with an enterocolitis. Consider infectious/inflammatory   etiology. Post cholecystectomy with minimal fluid in the gallbladder fossa. Typical for   the postprocedural setting. Narrative:  EXAM: CT ABD PELV W CONT   Clinical history: Pain, laparoscopic cholecystectomy. INDICATION: acute abd pain, vomiting, recent lap cholecystectomy       COMPARISON: 5/11/2019        CONTRAST: 100 mL of Isovue-370. TECHNIQUE:    Following the uneventful intravenous administration of contrast, thin axial   images were obtained through the abdomen and pelvis. Coronal and sagittal   reconstructions were generated. Oral contrast was not administered. CT dose   reduction was achieved through use of a standardized protocol tailored for this   examination and automatic exposure control for dose modulation. FINDINGS:    LUNG BASES: Clear. INCIDENTALLY IMAGED HEART AND MEDIASTINUM: Unremarkable. LIVER: No mass. Focal fatty sparing adjacent to the fissure. Trace intrahepatic   duct dilatation. GALLBLADDER: Status post cholecystectomy. There is minimal fluid in the   gallbladder fossa. SPLEEN: No mass. PANCREAS: No mass or ductal dilatation. ADRENALS: Unremarkable. KIDNEYS: No mass, calculus, or hydronephrosis. STOMACH: Unremarkable. SMALL BOWEL: Hyperemia of small bowel mucosa. Small bowel wall thickening. Hyperemia   COLON: Hyperemia. Minimal colonic wall thickening as well. APPENDIX: Unremarkable. PERITONEUM: No ascites or pneumoperitoneum. RETROPERITONEUM: No lymphadenopathy or aortic aneurysm. REPRODUCTIVE ORGANS: IUD in place. URINARY BLADDER: No mass or calculus. BONES: No destructive bone lesion. ADDITIONAL COMMENTS: N/A               CXR Results  (Last 48 hours)    None          Medical Decision Making   I am the first provider for this patient.     I reviewed the vital signs, available nursing notes, past medical history, past surgical history, family history and social history. Vital Signs-Reviewed the patient's vital signs. Patient Vitals for the past 24 hrs:   Pulse Resp BP SpO2   06/28/19 2131 70 16 (!) 133/96 96 %       Pulse Oximetry Analysis - 96% on RA    Cardiac Monitor:   Rate: 70 bpm  Rhythm: Normal Sinus Rhythm      Records Reviewed: Nursing Notes, Old Medical Records, Previous electrocardiograms, Previous Radiology Studies and Previous Laboratory Studies    Provider Notes (Medical Decision Making):   Pt presents with acute abdominal pain; vital signs stable with currently a non-peritoneal exam; DDx includes: Gastroenteritis, hepatitis, pancreatitis, obstruction, appendicitis, viral illness, IBD, diverticulitis, mesenteric ischemia, AAA or descending dissection, ACS, kidney stone. Will get labs, treat symptomatically and obtain serial abdominal exams to determine if additional imaging is indicated. Will reassess and monitor closely. ED Course:   Initial assessment performed. The patients presenting problems have been discussed, and they are in agreement with the care plan formulated and outlined with them. I have encouraged them to ask questions as they arise throughout their visit. TOBACCO COUNSELING:   Upon evaluation, pt expressed that they are a current tobacco user. For approximately 10 minutes, pt has been counseled on the dangers of smoking and was encouraged to quit as soon as possible in order to decrease further risks to their health. Pt has conveyed their understanding of the risks involved should they continue to use tobacco products. ALCOHOL/SUBSTANCE ABUSE COUNSELING:  Upon evaluation, pt endorsed recent alcohol/illicit drug use. For approximately 15 minutes, pt has been counseled on the dangers of alcohol and illicit drug use on their health, and they were encouraged to quit as soon as possible in order to decrease further risks to their health.  Pt has conveyed their understanding of the risks involved should they continue to use these products. I reviewed our electronic medical record system for any past medical records that were available that may contribute to the patient's current condition, the nursing notes and vital signs from today's visit. Amarilys Sales MD    Medications Administered During ED Course:  Medications   sodium chloride 0.9 % bolus infusion 1,000 mL (0 mL IntraVENous IV Completed 6/29/19 0213)   ondansetron (ZOFRAN) injection 4 mg (4 mg IntraVENous Given 6/28/19 2240)   metoclopramide HCl (REGLAN) injection 10 mg (10 mg IntraVENous Given 6/28/19 2246)   fentaNYL citrate (PF) injection 50 mcg (50 mcg IntraVENous Given 6/28/19 2246)   iopamidol (ISOVUE-370) 76 % injection 100 mL (100 mL IntraVENous Given 6/29/19 0002)   sodium chloride (NS) flush 10 mL (10 mL IntraVENous Given 6/29/19 0002)   haloperidol lactate (HALDOL) injection 5 mg (5 mg IntraVENous Given 6/29/19 0012)     Progress Note  I have re-examined the patient. she feels much better and symptoms improved. Tolerating oral intake. Abdomen is soft and without guarding, rebound or other peritoneal signs. I have discussed with patient the importance of close f/u and to return to the ED if symptoms don't improve or worsen. Progress Note:  Patient has been reassessed and reports feeling better and symptoms have improved after ED treatment. Shae Jimenez is able to tolerate PO and ambulate per baseline. Funmilayo Valles's final labs and imaging have been reviewed with her. She has been counseled regarding her diagnosis. She verbally conveys understanding and agreement of the signs, symptoms, diagnosis, treatment and prognosis and additionally agrees to follow up as recommended with Dr. Saintclair Hemming, MD in 24 - 48 hours. She also agrees with the care-plan and conveys that all of her questions have been answered.   I have also put together some discharge instructions for her that include: 1) educational information regarding their diagnosis, 2) how to care for their diagnosis at home, as well a 3) list of reasons why they would want to return to the ED prior to their follow-up appointment, should their condition change. I have answered all questions to the patient's satisfaction. Strict return precautions given. She both understood and agreed with plan as discussed above. Vital signs stable for discharge. Disposition: DISCHARGE     The pt is ready for discharge. The pt's signs, symptoms, diagnosis, and discharge instructions have been discussed and pt has conveyed their understanding. The pt is to follow up as recommended or return to ER should their symptoms worsen. Plan has been discussed and pt is in agreement. PLAN:  1. Discharge Medication List as of 6/29/2019  1:47 AM      START taking these medications    Details   promethazine (PHENERGAN) 25 mg tablet Take 1 Tab by mouth every six (6) hours as needed for Nausea. , Print, Disp-12 Tab, R-0      cephALEXin (KEFLEX) 500 mg capsule Take 1 Cap by mouth four (4) times daily for 7 days. , Print, Disp-28 Cap, R-0      metroNIDAZOLE (FLAGYL) 500 mg tablet Take 1 Tab by mouth two (2) times a day for 7 days. , Print, Disp-14 Tab, R-0      dicyclomine (BENTYL) 20 mg tablet Take 1 Tab by mouth every six (6) hours for 20 doses. , Print, Disp-20 Tab, R-0         CONTINUE these medications which have NOT CHANGED    Details   famotidine (PEPCID) 20 mg tablet Take 20 mg by mouth two (2) times a day., Historical Med      !! metoclopramide HCl (REGLAN) 5 mg tablet TAKE 1 TABLET BY MOUTH FOUR TIMES A DAY BEFORE MEALS AND AT BEDTIME, Historical Med, R-3      ibuprofen (MOTRIN) 600 mg tablet Take 1 Tab by mouth three (3) times daily (with meals). , Normal, Disp-21 Tab, R-0      !! metoclopramide HCl (REGLAN) 10 mg tablet Take 10 mg by mouth Before breakfast, lunch, dinner and at bedtime. , Historical Med      pantoprazole (PROTONIX) 40 mg tablet Take 1 Tab by mouth daily. , Print, Disp-42 Tab, R-1      escitalopram oxalate (LEXAPRO) 10 mg tablet Take 1 Tab by mouth daily. Indications: major depressive disorder, Print, Disp-30 Tab, R-0      levonorgestrel (MIRENA) 20 mcg/24 hr (5 years) IUD 1 Each by IntraUTERine route once., Historical Med       !! - Potential duplicate medications found. Please discuss with provider. 2.   Follow-up Information     Follow up With Specialties Details Why Contact Info    Norris Wong, [place] [address]  [phone]      [facility] EMERGENCY DEPT Emergency Medicine  As needed, If symptoms worsen [address] [place] [address] Drive  [address] [facility]  [phone]          Return to ED if worse  Diagnosis     Clinical Impression:   1. Acute vomiting    2. Gastroenteritis, acute    3. Enterocolitis    4. Acute abdominal pain    5. Tetrahydrocannabinol (THC) use disorder, mild, abuse    6. Urinary tract infection without hematuria, site unspecified    7. Leukocytosis, unspecified type    8. Tobacco abuse        Attestation:  I personally performed the services described in this documentation on this date 6/28/2019 for patient Daniel Aviles. Gasper Blakely MD    Please note that this dictation was completed with [employer], the [employer] voice recognition software. Quite often unanticipated grammatical, syntax, homophones, and other interpretive errors are inadvertently transcribed by the computer software. Please disregard these errors. Please excuse any errors that have escaped final proofreading. This note will not be viewable in [address]

## 2019-06-29 NOTE — ED TRIAGE NOTES
Pt arrived in ER with c/o vomiting since 2 am.  Pt reports having abd pain in the center. Pt had gallbladder out a month ago. Pt had follow up with GI, reported everything looked good from GI. Pt reported diarrhea. Denies blood or black stool.

## 2019-06-29 NOTE — ED NOTES
Patient discharged by Carly Rizzo MD. Patient provided with discharge instructions Rx and instructions on follow up care. Patient out of ED ambulatory accompanied by mother.

## 2019-06-29 NOTE — DISCHARGE INSTRUCTIONS
Thank you for allowing us to take care of you today! We hope we addressed all of your concerns and needs. We strive to provide excellent quality care in the Emergency Department. You will receive a survey after your visit to evaluate the care you were provided. Should you receive a survey from us, we invite you to share your experience and tell us what made it excellent. It was a pleasure serving you, we invite you to share your experience with us, in our pursuit for excellence, should you be selected to receive a survey. The exam and treatment you received in the Emergency Department were for an urgent problem and are not intended as complete care. It is important that you follow up with a doctor, nurse practitioner, or physician assistant for ongoing care. If your symptoms become worse or you do not improve as expected and you are unable to reach your usual health care provider, you should return to the Emergency Department. We are available 24 hours a day. Please take your discharge instructions with you when you go to your follow-up appointment. If you have any problem arranging a follow-up appointment, contact the Emergency Department immediately. If a prescription has been provided, please have it filled as soon as possible to prevent a delay in treatment. Read the entire medication instruction sheet provided to you by the pharmacy. If you have any questions or reservations about taking the medication due to side effects or interactions with other medications, please call your primary care physician or contact the ER to speak with the charge nurse. Make an appointment with your family doctor or the physician you were referred to for follow-up of this visit as instructed on your discharge paperwork, as this is mandatory follow-up. Return to the ER if you are unable to be seen or if you are unable to be seen in a timely manner.     If you have any problem arranging the follow-up visit, contact the Emergency Department immediately. I hope you feel better and thank you again for allow us to provide you with excellent care today at [facility]! Warmest regards,    Hilaria Sanchez MD  Emergency Medicine Physician  [facility]              Patient Education        Colitis: Care Instructions  Your Care Instructions  Colitis is the medical term for swelling (inflammation) of the intestine. It can be caused by different things, such as an infection or loss of blood flow in the intestine. Other causes are problems like Crohn's disease or ulcerative colitis. Symptoms may include fever, diarrhea that may be bloody, or belly pain. Sometimes symptoms go away without treatment. But you may need treatment or more tests, such as blood tests or a stool test. Or you may need imaging tests like a CT scan or a colonoscopy. In some cases, the doctor may want to test a sample of tissue from the intestine. This test is called a biopsy. The doctor has checked you carefully, but problems can develop later. If you notice any problems or new symptoms, get medical treatment right away. Follow-up care is a key part of your treatment and safety. Be sure to make and go to all appointments, and call your doctor if you are having problems. It's also a good idea to know your test results and keep a list of the medicines you take. How can you care for yourself at home? · Rest until you feel better. · Your doctor may recommend that you eat bland foods. These include rice, dry toast or crackers, bananas, and applesauce. · To prevent dehydration, drink plenty of fluids. Choose water and other caffeine-free clear liquids until you feel better. If you have kidney, heart, or liver disease and have to limit fluids, talk with your doctor before you increase the amount of fluids you drink. · Be safe with medicines.  Take your medicines exactly as prescribed. Call your doctor if you think you are having a problem with your medicine. You will get more details on the specific medicines your doctor prescribes. When should you call for help? Call 911 anytime you think you may need emergency care. For example, call if:    · You passed out (lost consciousness).     · Your stools are maroon or very bloody.    Call your doctor now or seek immediate medical care if:    · You have new or worse belly pain.     · You have a fever.     · You are vomiting.     · You cannot pass stools or gas.     · You have new or more blood in your stools.    Watch closely for changes in your health, and be sure to contact your doctor if:    · You have new or worse symptoms.     · You are losing weight.     · You do not get better as expected. Where can you learn more? Go to http://john-trini.info/. Neris Buckley in the search box to learn more about \"Colitis: Care Instructions. \"  Current as of: March 27, 2018  Content Version: 11.9  © 3671-5275 [employer]. Care instructions adapted under license by [employer] (which disclaims liability or warranty for this information). If you have questions about a medical condition or this instruction, always ask your healthcare professional. [address] any warranty or liability for your use of this information.

## 2019-06-30 LAB
BACTERIA SPEC CULT: NORMAL
CC UR VC: NORMAL
SERVICE CMNT-IMP: NORMAL

## 2019-07-01 ENCOUNTER — PATIENT OUTREACH (OUTPATIENT)
Dept: OTHER | Age: 19
End: 2019-07-01

## 2019-07-01 NOTE — PROGRESS NOTES
Patient on report as eligible for Case Management. Unable to leave a VM for patient, as the VM is full. Will attempt to contact again to offer [address] Management services.

## 2019-07-02 ENCOUNTER — HOSPITAL ENCOUNTER (EMERGENCY)
Age: 19
Discharge: HOME | End: 2019-07-02
Attending: EMERGENCY MEDICINE
Payer: COMMERCIAL

## 2019-07-02 VITALS
DIASTOLIC BLOOD PRESSURE: 79 MMHG | SYSTOLIC BLOOD PRESSURE: 120 MMHG | HEIGHT: 61 IN | RESPIRATION RATE: 16 BRPM | OXYGEN SATURATION: 98 % | TEMPERATURE: 97.8 F | WEIGHT: 115.96 LBS | BODY MASS INDEX: 21.89 KG/M2

## 2019-07-02 DIAGNOSIS — R11.2 INTRACTABLE VOMITING WITH NAUSEA, UNSPECIFIED VOMITING TYPE: Primary | ICD-10-CM

## 2019-07-02 DIAGNOSIS — R11.2 CANNABINOID HYPEREMESIS SYNDROME: ICD-10-CM

## 2019-07-02 DIAGNOSIS — F12.90 CANNABINOID HYPEREMESIS SYNDROME: ICD-10-CM

## 2019-07-02 LAB
ALBUMIN SERPL-MCNC: 4.6 G/DL (ref 3.5–5)
ALBUMIN/GLOB SERPL: 1.4 {RATIO} (ref 1.1–2.2)
ALP SERPL-CCNC: 66 U/L (ref 45–117)
ALT SERPL-CCNC: 28 U/L (ref 12–78)
ANION GAP SERPL CALC-SCNC: 7 MMOL/L (ref 5–15)
APPEARANCE UR: ABNORMAL
AST SERPL-CCNC: 23 U/L (ref 15–37)
BACTERIA URNS QL MICRO: ABNORMAL /HPF
BASOPHILS # BLD: 0.1 K/UL (ref 0–0.1)
BASOPHILS NFR BLD: 1 % (ref 0–1)
BILIRUB SERPL-MCNC: 2.2 MG/DL (ref 0.2–1)
BILIRUB UR QL: NEGATIVE
BUN SERPL-MCNC: 10 MG/DL (ref 6–20)
BUN/CREAT SERPL: 13 (ref 12–20)
CALCIUM SERPL-MCNC: 9.4 MG/DL (ref 8.5–10.1)
CHLORIDE SERPL-SCNC: 105 MMOL/L (ref 97–108)
CO2 SERPL-SCNC: 27 MMOL/L (ref 21–32)
COLOR UR: ABNORMAL
CREAT SERPL-MCNC: 0.78 MG/DL (ref 0.55–1.02)
DIFFERENTIAL METHOD BLD: ABNORMAL
EOSINOPHIL # BLD: 0.2 K/UL (ref 0–0.4)
EOSINOPHIL NFR BLD: 2 % (ref 0–7)
EPITH CASTS URNS QL MICRO: ABNORMAL /LPF
ERYTHROCYTE [DISTWIDTH] IN BLOOD BY AUTOMATED COUNT: 12.7 % (ref 11.5–14.5)
GLOBULIN SER CALC-MCNC: 3.4 G/DL (ref 2–4)
GLUCOSE SERPL-MCNC: 108 MG/DL (ref 65–100)
GLUCOSE UR STRIP.AUTO-MCNC: NEGATIVE MG/DL
HCG SERPL QL: NEGATIVE
HCT VFR BLD AUTO: 42 % (ref 35–47)
HGB BLD-MCNC: 14.6 G/DL (ref 11.5–16)
HGB UR QL STRIP: ABNORMAL
IMM GRANULOCYTES # BLD AUTO: 0.1 K/UL (ref 0–0.04)
IMM GRANULOCYTES NFR BLD AUTO: 1 % (ref 0–0.5)
KETONES UR QL STRIP.AUTO: ABNORMAL MG/DL
LEUKOCYTE ESTERASE UR QL STRIP.AUTO: ABNORMAL
LIPASE SERPL-CCNC: 83 U/L (ref 73–393)
LYMPHOCYTES # BLD: 1.2 K/UL (ref 0.8–3.5)
LYMPHOCYTES NFR BLD: 10 % (ref 12–49)
MCH RBC QN AUTO: 34 PG (ref 26–34)
MCHC RBC AUTO-ENTMCNC: 34.8 G/DL (ref 30–36.5)
MCV RBC AUTO: 97.7 FL (ref 80–99)
MONOCYTES # BLD: 0.7 K/UL (ref 0–1)
MONOCYTES NFR BLD: 6 % (ref 5–13)
NEUTS SEG # BLD: 10.4 K/UL (ref 1.8–8)
NEUTS SEG NFR BLD: 80 % (ref 32–75)
NITRITE UR QL STRIP.AUTO: NEGATIVE
NRBC # BLD: 0 K/UL (ref 0–0.01)
NRBC BLD-RTO: 0 PER 100 WBC
PH UR STRIP: 6.5 [PH] (ref 5–8)
PLATELET # BLD AUTO: 335 K/UL (ref 150–400)
PMV BLD AUTO: 10.2 FL (ref 8.9–12.9)
POTASSIUM SERPL-SCNC: 3.8 MMOL/L (ref 3.5–5.1)
PROT SERPL-MCNC: 8 G/DL (ref 6.4–8.2)
PROT UR STRIP-MCNC: NEGATIVE MG/DL
RBC # BLD AUTO: 4.3 M/UL (ref 3.8–5.2)
RBC #/AREA URNS HPF: ABNORMAL /HPF (ref 0–5)
SODIUM SERPL-SCNC: 139 MMOL/L (ref 136–145)
SP GR UR REFRACTOMETRY: 1.01 (ref 1–1.03)
UA: UC IF INDICATED,UAUC: ABNORMAL
UROBILINOGEN UR QL STRIP.AUTO: 0.2 EU/DL (ref 0.2–1)
WBC # BLD AUTO: 12.7 K/UL (ref 3.6–11)
WBC URNS QL MICRO: ABNORMAL /HPF (ref 0–4)
YEAST URNS QL MICRO: PRESENT
YEAST URNS QL MICRO: PRESENT

## 2019-07-02 PROCEDURE — 84703 CHORIONIC GONADOTROPIN ASSAY: CPT

## 2019-07-02 PROCEDURE — 96376 TX/PRO/DX INJ SAME DRUG ADON: CPT

## 2019-07-02 PROCEDURE — 83690 ASSAY OF LIPASE: CPT

## 2019-07-02 PROCEDURE — 96374 THER/PROPH/DIAG INJ IV PUSH: CPT

## 2019-07-02 PROCEDURE — 36415 COLL VENOUS BLD VENIPUNCTURE: CPT

## 2019-07-02 PROCEDURE — 87086 URINE CULTURE/COLONY COUNT: CPT

## 2019-07-02 PROCEDURE — 81001 URINALYSIS AUTO W/SCOPE: CPT

## 2019-07-02 PROCEDURE — 85025 COMPLETE CBC W/AUTO DIFF WBC: CPT

## 2019-07-02 PROCEDURE — 74011250636 HC RX REV CODE- 250/636: Performed by: EMERGENCY MEDICINE

## 2019-07-02 PROCEDURE — 96375 TX/PRO/DX INJ NEW DRUG ADDON: CPT

## 2019-07-02 PROCEDURE — 96361 HYDRATE IV INFUSION ADD-ON: CPT

## 2019-07-02 PROCEDURE — 99284 EMERGENCY DEPT VISIT MOD MDM: CPT

## 2019-07-02 PROCEDURE — 80053 COMPREHEN METABOLIC PANEL: CPT

## 2019-07-02 RX ORDER — LORAZEPAM 0.5 MG/1
1 TABLET ORAL
Qty: 15 TAB | Refills: 0 | Status: SHIPPED | OUTPATIENT
Start: 2019-07-02 | End: 2019-08-21

## 2019-07-02 RX ORDER — METOCLOPRAMIDE HYDROCHLORIDE 5 MG/ML
10 INJECTION INTRAMUSCULAR; INTRAVENOUS
Status: COMPLETED | OUTPATIENT
Start: 2019-07-02 | End: 2019-07-02

## 2019-07-02 RX ORDER — ONDANSETRON 2 MG/ML
4 INJECTION INTRAMUSCULAR; INTRAVENOUS
Status: COMPLETED | OUTPATIENT
Start: 2019-07-02 | End: 2019-07-02

## 2019-07-02 RX ORDER — ESCITALOPRAM OXALATE 10 MG/1
15 TABLET ORAL DAILY
COMMUNITY

## 2019-07-02 RX ORDER — ONDANSETRON 4 MG/1
4 TABLET, ORALLY DISINTEGRATING ORAL
COMMUNITY
End: 2019-08-21 | Stop reason: ALTCHOICE

## 2019-07-02 RX ORDER — LORAZEPAM 2 MG/ML
1 INJECTION INTRAMUSCULAR
Status: COMPLETED | OUTPATIENT
Start: 2019-07-02 | End: 2019-07-02

## 2019-07-02 RX ORDER — LORAZEPAM 1 MG/1
1 TABLET ORAL
Qty: 20 TAB | Refills: 0 | Status: SHIPPED | OUTPATIENT
Start: 2019-07-02 | End: 2019-08-21

## 2019-07-02 RX ORDER — METOCLOPRAMIDE 5 MG/1
5 TABLET ORAL
COMMUNITY
End: 2019-08-21

## 2019-07-02 RX ORDER — PROMETHAZINE HYDROCHLORIDE 25 MG/1
25 SUPPOSITORY RECTAL
Qty: 12 SUPPOSITORY | Refills: 0 | Status: SHIPPED | OUTPATIENT
Start: 2019-07-02

## 2019-07-02 RX ADMIN — ONDANSETRON 4 MG: 2 INJECTION INTRAMUSCULAR; INTRAVENOUS at 09:26

## 2019-07-02 RX ADMIN — LORAZEPAM 1 MG: 2 INJECTION INTRAMUSCULAR; INTRAVENOUS at 10:18

## 2019-07-02 RX ADMIN — METOCLOPRAMIDE 10 MG: 5 INJECTION, SOLUTION INTRAMUSCULAR; INTRAVENOUS at 09:26

## 2019-07-02 RX ADMIN — ONDANSETRON 4 MG: 2 INJECTION INTRAMUSCULAR; INTRAVENOUS at 10:16

## 2019-07-02 RX ADMIN — SODIUM CHLORIDE 1000 ML: 900 INJECTION, SOLUTION INTRAVENOUS at 09:26

## 2019-07-02 NOTE — DISCHARGE INSTRUCTIONS
Patient Education        Nausea and Vomiting: Care Instructions  Your Care Instructions    When you are nauseated, you may feel weak and sweaty and notice a lot of saliva in your mouth. Nausea often leads to vomiting. Most of the time you do not need to worry about nausea and vomiting, but they can be signs of other illnesses. Two common causes of nausea and vomiting are stomach flu and food poisoning. Nausea and vomiting from viral stomach flu will usually start to improve within 24 hours. Nausea and vomiting from food poisoning may last from 12 to 48 hours. The doctor has checked you carefully, but problems can develop later. If you notice any problems or new symptoms, get medical treatment right away. Follow-up care is a key part of your treatment and safety. Be sure to make and go to all appointments, and call your doctor if you are having problems. It's also a good idea to know your test results and keep a list of the medicines you take. How can you care for yourself at home? · To prevent dehydration, drink plenty of fluids, enough so that your urine is light yellow or clear like water. Choose water and other caffeine-free clear liquids until you feel better. If you have kidney, heart, or liver disease and have to limit fluids, talk with your doctor before you increase the amount of fluids you drink. · Rest in bed until you feel better. · When you are able to eat, try clear soups, mild foods, and liquids until all symptoms are gone for 12 to 48 hours. Other good choices include dry toast, crackers, cooked cereal, and gelatin dessert, such as Jell-O. When should you call for help? Call 911 anytime you think you may need emergency care. For example, call if:    · You passed out (lost consciousness).    Call your doctor now or seek immediate medical care if:    · You have symptoms of dehydration, such as:  ? Dry eyes and a dry mouth. ? Passing only a little dark urine. ?  Feeling thirstier than usual.   · You have new or worsening belly pain.     · You have a new or higher fever.     · You vomit blood or what looks like coffee grounds.    Watch closely for changes in your health, and be sure to contact your doctor if:    · You have ongoing nausea and vomiting.     · Your vomiting is getting worse.     · Your vomiting lasts longer than 2 days.     · You are not getting better as expected. Where can you learn more? Go to http://john-trini.info/. Enter 25 [id number] in the search box to learn more about \"Nausea and Vomiting: Care Instructions. \"  Current as of: September 23, 2018  Content Version: 11.9  © 0974-3099 [employer]. Care instructions adapted under license by [employer] (which disclaims liability or warranty for this information). If you have questions about a medical condition or this instruction, always ask your healthcare professional. [place] [address] any warranty or liability for your use of this information. Patient Education        Learning About Cannabis Use Disorder  What is cannabis use disorder? Cannabis is a drug that comes from the cannabis plant. Different forms of cannabis include marijuana, hashish, and hash oil. Using cannabis doesn't often cause health problems. But some people become dependent on it. This is called cannabis use disorder. It can cause problems like:  · Extreme changes in mood. · Trouble concentrating. · Memory problems. What are the symptoms? You may be dependent on cannabis if two or more of the following are true:  · You use larger amounts of the drug than you ever meant to. Or you've been using it for a longer period of time than you ever meant to. · You can't cut down or control your use. Or you constantly wish you could cut down. · You spend a lot of time getting or using the drug, or recovering from the effects. · You have strong cravings for the drug.   · You can no longer do your main jobs at work, school, or home. · You keep using even though your drug use is hurting your relationships. · You have stopped doing important activities because of your drug use. · You use drugs in situations where doing so is dangerous. · You keep using it even though you know it is causing physical or psychological health problems. · You need more and more of the drug to get the same effect, or you get less effect from the same amount over time. This is called tolerance. · You have uncomfortable symptoms when you try to stop using the drug. This is called withdrawal.  How is cannabis use disorder treated? Treatment may include group therapy, one or more types of counseling, and drug education. Sometimes medicines are used to help manage symptoms. Treatment focuses on more than drug use. It helps you cope with the anger, frustration, sadness, and disappointment that often happen when a person tries to stop using drugs. Treatment also looks at other parts of your life. For example, how are your relationships with friends and family? What's going on at school and work? Do you have health problems? What is your living situation? Treatment helps you find and manage problems. It helps you take control of your life so you don't have to depend on drugs. A drug problem affects the whole family. Family counseling often is part of treatment. Follow-up care is a key part of your treatment and safety. Be sure to make and go to all appointments, and call your doctor if you are having problems. It's also a good idea to know your test results and keep a list of the medicines you take. Where can you learn more? Go to http://john-trini.info/. Enter Q013 in the search box to learn more about \"Learning About Cannabis Use Disorder. \"  Current as of: May 7, 2018  Content Version: 11.9  © 5508-0493 [employer].  Care instructions adapted under license by [employer] (which disclaims liability or warranty for this information). If you have questions about a medical condition or this instruction, always ask your healthcare professional. [place] [address] any warranty or liability for your use of this information.

## 2019-07-02 NOTE — LETTER
7/4/2019 Mariela Duke [address] [facility] [id number] Dear Ms. Makayla Srinivasan, You were recently seen in the Emergency Department of [facility] and had lab work performed. We would like to discuss these results with you. Please call the Emergency Department at your earliest convenience at [phone] between 10am-8pm to speak with one of our providers. Sincerely, Art Dillard [facility] 
[address] [place] [address] 
[phone]

## 2019-07-02 NOTE — PROGRESS NOTES
Pharmacy Clarification of Prior to Admission Medication Regimen     The patient was interviewed regarding clarification of the prior to admission medication regimen. Patient's mother and father were present in room and obtained permission from patient to discuss drug regimen with visitor(s) present. Patient's mother was questioned regarding use of any other inhalers, topical products, over the counter medications, herbal medications, vitamin products or ophthalmic/nasal/otic medication use. Information Obtained From: RX Query, Patient's mother    Pertinent Pharmacy Findings:  cephALEXin (KEFLEX) 500 mg capsule: Patient started a 7 day regimen on 19. Patient has completed 2 days of therapy as of 2019. Antibiotic Indication: UTI   metroNIDAZOLE (FLAGYL) 500 mg tablet: Patient started a 7 day regimen on 19. Patient has completed 2 days of therapy as of 19. Patient's mother is a nurse here at [facility]. PTA medication list was corrected to the following:     Prior to Admission Medications   Prescriptions Last Dose Informant Patient Reported? Taking? cephALEXin (KEFLEX) 500 mg capsule 2019 at Unknown time Parent No Yes   Sig: Take 1 Cap by mouth four (4) times daily for 7 days. escitalopram oxalate (LEXAPRO) 10 mg tablet 2019 at Unknown time Parent Yes Yes   Sig: Take 15 mg by mouth daily. famotidine (PEPCID) 20 mg tablet 2019 at Unknown time Parent Yes Yes   Sig: Take 20 mg by mouth two (2) times a day. levonorgestrel (MIRENA) 20 mcg/24 hr (5 years) IUD 2019 at Unknown time Parent Yes Yes   Si Each by IntraUTERine route once. metoclopramide HCl (REGLAN) 5 mg tablet 2019 at Unknown time Parent Yes Yes   Sig: Take 5 mg by mouth nightly. metroNIDAZOLE (FLAGYL) 500 mg tablet 2019 at Unknown time Parent No Yes   Sig: Take 1 Tab by mouth two (2) times a day for 7 days.    ondansetron (ZOFRAN ODT) 4 mg disintegrating tablet 2019 at Unknown time Parent Yes Yes   Sig: Take 4 mg by mouth every eight (8) hours as needed for Nausea. pantoprazole (PROTONIX) 40 mg tablet 7/1/2019 at Unknown time Parent No Yes   Sig: Take 1 Tab by mouth daily. promethazine (PHENERGAN) 25 mg tablet 7/2/2019 at Unknown time Parent No Yes   Sig: Take 1 Tab by mouth every six (6) hours as needed for Nausea.       Facility-Administered Medications: None          Thank you,  Leo Hartman  Medication History Pharmacy Technician

## 2019-07-02 NOTE — ED NOTES
Patient given discharge instructions by Dr Lizbeth Mart. Patient was able to verbalize understanding of instructions. Questions answered  Patient ambulated out of ED in stable condition.

## 2019-07-02 NOTE — ED PROVIDER NOTES
EMERGENCY DEPARTMENT HISTORY AND PHYSICAL EXAM      Date: 7/2/2019  Patient Name: Libra Metzger    History of Presenting Illness     Chief Complaint   Patient presents with    Vomiting       History Provided By: Patient    HPI: Libra Metzger, 22 y.o. female with PMHx significant for intractable vomiting x1 year, presents to the ED with cc of severe nausea, vomiting, and upper abdominal pain over the last 12 hours. Patient was seen here in June 28 in the emergency department and treated successfully with IV medications and fluids. Patient felt better over the next 2 days but symptoms recurred this morning. At times patient reports warm showers helped her symptoms but this morning they did not. She has a several year history of daily marijuana use however she stopped smoking 2 days ago. She has had her gallbladder removed in the past.  She denies any other associated symptoms. No other exacerbating or militating factors. There are no other complaints, changes, or physical findings at this time. PCP: Jagruti Luo MD    No current facility-administered medications on file prior to encounter. Current Outpatient Medications on File Prior to Encounter   Medication Sig Dispense Refill    escitalopram oxalate (LEXAPRO) 10 mg tablet Take 15 mg by mouth daily.  metoclopramide HCl (REGLAN) 5 mg tablet Take 5 mg by mouth nightly.  ondansetron (ZOFRAN ODT) 4 mg disintegrating tablet Take 4 mg by mouth every eight (8) hours as needed for Nausea.  cephALEXin (KEFLEX) 500 mg capsule Take 1 Cap by mouth four (4) times daily for 7 days. 28 Cap 0    metroNIDAZOLE (FLAGYL) 500 mg tablet Take 1 Tab by mouth two (2) times a day for 7 days. 14 Tab 0    famotidine (PEPCID) 20 mg tablet Take 20 mg by mouth two (2) times a day.  pantoprazole (PROTONIX) 40 mg tablet Take 1 Tab by mouth daily. 42 Tab 1    levonorgestrel (MIRENA) 20 mcg/24 hr (5 years) IUD 1 Each by IntraUTERine route once. Past History     Past Medical History:  Past Medical History:   Diagnosis Date    Major depressive disorder 4/25/2019    Psychiatric disorder 2019    Anxiety       Past Surgical History:  Past Surgical History:   Procedure Laterality Date    COLONOSCOPY N/A 5/14/2019    [facility] at [facility]COPY    COLONOSCOPY,DIAGNOSTIC  5/14/2019         HX CHOLECYSTECTOMY  05/30/2019    HX HEENT      wisdom teeth    UPPER GI ENDOSCOPY,BIOPSY  11/16/2018            Family History:  Family History   Problem Relation Age of Onset    Other Mother         divericulitis, gallbladder removal    No Known Problems Father     Crohn's Disease Maternal Grandmother 48    Other Maternal Grandmother         divericulitis and gallbladder removal    Stroke Maternal Grandfather     Hypertension Maternal Grandfather     High Cholesterol Maternal Grandfather     Cataract Maternal Grandfather     Stroke Paternal Grandmother     Cancer Paternal Grandmother 75        kidney, breast---age 61    No Known Problems Paternal Grandfather        Social History:  Social History     Tobacco Use    Smoking status: Current Some Day Smoker     Packs/day: 1.00     Years: 1.00     Pack years: 1.00    Smokeless tobacco: Never Used    Tobacco comment: Per pt, \"occasionally\"   Substance Use Topics    Alcohol use: Yes     Comment: Per pt, \"rarely\"    Drug use: Yes     Frequency: 7.0 times per week     Types: Marijuana, Prescription, OTC     Comment: usually every other day       Allergies:  No Known Allergies      Review of Systems   Review of Systems   Constitutional: Negative for chills, diaphoresis, fatigue and fever. HENT: Negative for ear pain and sore throat. Eyes: Negative for pain and redness. Respiratory: Negative for cough and shortness of breath. Cardiovascular: Negative for chest pain and leg swelling. Gastrointestinal: Positive for abdominal pain, nausea and vomiting.  Negative for diarrhea. Endocrine: Negative for cold intolerance and heat intolerance. Genitourinary: Negative for flank pain and hematuria. Musculoskeletal: Negative for back pain and neck stiffness. Skin: Negative for rash and wound. Neurological: Negative for dizziness, syncope and headaches. All other systems reviewed and are negative. Physical Exam   Physical Exam   Constitutional: She is oriented to person, place, and time. She appears well-developed and well-nourished. Healthy young female who appears in moderate distress. She is actively vomiting. She is alert and oriented x3. HENT:   Head: Normocephalic and atraumatic. Mouth/Throat: Oropharynx is clear and moist. No oropharyngeal exudate. Eyes: Pupils are equal, round, and reactive to light. Conjunctivae and EOM are normal.   Neck: Normal range of motion. Cardiovascular: Normal rate and regular rhythm. No murmur heard. Pulmonary/Chest: Effort normal and breath sounds normal. No respiratory distress. She has no wheezes. Abdominal: Soft. Bowel sounds are normal. She exhibits no distension. There is tenderness. Tenderness to palpation in the epigastric region without rebound or guarding. No diffuse signs of peritonitis. Normal bowel sounds. Musculoskeletal: Normal range of motion. She exhibits no edema or deformity. Neurological: She is alert and oriented to person, place, and time. Coordination normal.   Skin: Skin is warm and dry. No rash noted. Psychiatric: She has a normal mood and affect. Her behavior is normal.   Nursing note and vitals reviewed.       Diagnostic Study Results     Labs -     Recent Results (from the past 24 hour(s))   CBC WITH AUTOMATED DIFF    Collection Time: 07/02/19  9:14 AM   Result Value Ref Range    WBC 12.7 (H) 3.6 - 11.0 K/uL    RBC 4.30 3.80 - 5.20 M/uL    HGB 14.6 11.5 - 16.0 g/dL    HCT 42.0 35.0 - 47.0 %    MCV 97.7 80.0 - 99.0 FL    MCH 34.0 26.0 - 34.0 PG    MCHC 34.8 30.0 - 36.5 g/dL    RDW 12.7 11.5 - 14.5 %    PLATELET 547 284 - 851 K/uL    MPV 10.2 8.9 - 12.9 FL    NRBC 0.0 0  WBC    ABSOLUTE NRBC 0.00 0.00 - 0.01 K/uL    NEUTROPHILS 80 (H) 32 - 75 %    LYMPHOCYTES 10 (L) 12 - 49 %    MONOCYTES 6 5 - 13 %    EOSINOPHILS 2 0 - 7 %    BASOPHILS 1 0 - 1 %    IMMATURE GRANULOCYTES 1 (H) 0.0 - 0.5 %    ABS. NEUTROPHILS 10.4 (H) 1.8 - 8.0 K/UL    ABS. LYMPHOCYTES 1.2 0.8 - 3.5 K/UL    ABS. MONOCYTES 0.7 0.0 - 1.0 K/UL    ABS. EOSINOPHILS 0.2 0.0 - 0.4 K/UL    ABS. BASOPHILS 0.1 0.0 - 0.1 K/UL    ABS. IMM. GRANS. 0.1 (H) 0.00 - 0.04 K/UL    DF AUTOMATED     METABOLIC PANEL, COMPREHENSIVE    Collection Time: 07/02/19  9:14 AM   Result Value Ref Range    Sodium 139 136 - 145 mmol/L    Potassium 3.8 3.5 - 5.1 mmol/L    Chloride 105 97 - 108 mmol/L    CO2 27 21 - 32 mmol/L    Anion gap 7 5 - 15 mmol/L    Glucose 108 (H) 65 - 100 mg/dL    BUN 10 6 - 20 MG/DL    Creatinine 0.78 0.55 - 1.02 MG/DL    BUN/Creatinine ratio 13 12 - 20      GFR est AA >60 >60 ml/min/1.73m2    GFR est non-AA >60 >60 ml/min/1.73m2    Calcium 9.4 8.5 - 10.1 MG/DL    Bilirubin, total 2.2 (H) 0.2 - 1.0 MG/DL    ALT (SGPT) 28 12 - 78 U/L    AST (SGOT) 23 15 - 37 U/L    Alk.  phosphatase 66 45 - 117 U/L    Protein, total 8.0 6.4 - 8.2 g/dL    Albumin 4.6 3.5 - 5.0 g/dL    Globulin 3.4 2.0 - 4.0 g/dL    A-G Ratio 1.4 1.1 - 2.2     LIPASE    Collection Time: 07/02/19  9:14 AM   Result Value Ref Range    Lipase 83 73 - 393 U/L   HCG QL SERUM    Collection Time: 07/02/19  9:14 AM   Result Value Ref Range    HCG, Ql. NEGATIVE  NEG     URINALYSIS W/ REFLEX CULTURE    Collection Time: 07/02/19 11:47 AM   Result Value Ref Range    Color YELLOW/STRAW      Appearance CLOUDY (A) CLEAR      Specific gravity 1.010 1.003 - 1.030      pH (UA) 6.5 5.0 - 8.0      Protein NEGATIVE  NEG mg/dL    Glucose NEGATIVE  NEG mg/dL    Ketone TRACE (A) NEG mg/dL    Bilirubin NEGATIVE  NEG      Blood SMALL (A) NEG      Urobilinogen 0.2 0.2 - 1.0 EU/dL    Nitrites NEGATIVE  NEG      Leukocyte Esterase TRACE (A) NEG      WBC 10-20 0 - 4 /hpf    RBC 5-10 0 - 5 /hpf    Epithelial cells FEW FEW /lpf    Bacteria 1+ (A) NEG /hpf    UA:UC IF INDICATED URINE CULTURE ORDERED (A) CNI      Yeast PRESENT (A) NEG      Yeast w/hyphae PRESENT (A) NEG         Radiologic Studies -   No orders to display     CT Results  (Last 48 hours)    None        CXR Results  (Last 48 hours)    None            Medical Decision Making   I am the first provider for this patient. I reviewed the vital signs, available nursing notes, past medical history, past surgical history, family history and social history. Vital Signs-Reviewed the patient's vital signs. Patient Vitals for the past 24 hrs:   Temp Resp BP SpO2   07/02/19 1020   120/79    07/02/19 0918 97.8 °F (36.6 °C) 16     07/02/19 0900   133/82 98 %   07/02/19 0856    98 %   07/02/19 0854   133/80        Pulse Oximetry Analysis -98 % on room air    Cardiac Monitor:   Rate: 85 bpm  Rhythm: Normal Sinus Rhythm        Records Reviewed: Nursing Notes and Old Medical Records    Differential Diagnosis:    Pt presents with acute abdominal pain; vital signs stable with currently a non-peritoneal exam; DDx includes: Gastroenteritis, hepatitis, pancreatitis, obstruction, appendicitis, viral illness, IBD, diverticulitis, mesenteric ischemia, AAA or descending dissection, ACS, kidney stone. Will get labs, treat symptomatically and obtain serial abdominal exams to determine if a CT is warranted. Will reassess and monitor closely. Provider Notes (Medical Decision Making):   Patient feeling much better after multiple rounds of IV medications. She got 1 L of normal saline and is improving. Her white blood cell count is improving as well. She still has a UTI and is likely healing from a gastroenteritis. I do suspect cannabinoid hyperemesis syndrome.   Discussed with her gastroenterologist Dr. Pedro Pablo Finch who agrees with close outpatient follow-up. He has the patient be taking Reglan 4 times a day which she is currently not taking. She will follow-up with him as an outpatient return with worsening symptoms. ED Course:     Initial assessment performed. The patients presenting problems have been discussed, and they are in agreement with the care plan formulated and outlined with them. I have encouraged them to ask questions as they arise throughout their visit. Critical Care Time:     None    Disposition:  12:56 PM  Funmilayo Valles's  results have been reviewed with her. She has been counseled regarding her diagnosis. She verbally conveys understanding and agreement of the signs, symptoms, diagnosis, treatment and prognosis and additionally agrees to follow up as recommended with Dr. Gamaliel Quach MD in 24 - 48 hours. She also agrees with the care-plan and conveys that all of her questions have been answered. I have also put together some discharge instructions for her that include: 1) educational information regarding their diagnosis, 2) how to care for their diagnosis at home, as well a 3) list of reasons why they would want to return to the ED prior to their follow-up appointment, should their condition change. PLAN:  1. Current Discharge Medication List      START taking these medications    Details   !! LORazepam (ATIVAN) 0.5 mg tablet Take 2 Tabs by mouth every eight (8) hours as needed for Anxiety. Max Daily Amount: 3 mg. Qty: 15 Tab, Refills: 0    Associated Diagnoses: Intractable vomiting with nausea, unspecified vomiting type      promethazine (PHENERGAN) 25 mg suppository Insert 1 Suppository into rectum every six (6) hours as needed for Nausea. Qty: 12 Suppository, Refills: 0      !! LORazepam (ATIVAN) 1 mg tablet Take 1 Tab by mouth every eight (8) hours as needed for Anxiety. Max Daily Amount: 3 mg.   Qty: 20 Tab, Refills: 0    Associated Diagnoses: Intractable vomiting with nausea, unspecified vomiting type       !! - Potential duplicate medications found. Please discuss with provider. STOP taking these medications       promethazine (PHENERGAN) 25 mg tablet Comments:   Reason for Stoppin.   Follow-up Information     Follow up With Specialties Details Why Contact Info    Peggy Millan MD Gastroenterology In 1 week  [address]  [address] [facility]  [phone]          Return to ED if worse     Diagnosis     Clinical Impression:   1. Intractable vomiting with nausea, unspecified vomiting type    2.  Cannabinoid hyperemesis syndrome ([facility] [address]

## 2019-07-03 ENCOUNTER — PATIENT OUTREACH (OUTPATIENT)
Dept: OTHER | Age: 19
End: 2019-07-03

## 2019-07-03 LAB
BACTERIA SPEC CULT: ABNORMAL
CC UR VC: ABNORMAL
SERVICE CMNT-IMP: ABNORMAL

## 2019-07-03 NOTE — PROGRESS NOTES
Patient identified as eligible for [address] [place] [address] services. Second telephone outreach attempted. Left discreet voicemail with this CM confidential contact information. Will send UTR letter.

## 2019-07-03 NOTE — LETTER
7/3/2019 10:44 AM 
 
Ms. Dmitri Wing [address] [facility] [address] Dear Ms. Ana Mace, My name is Manav Dillard, Employee Care Manager for [address] [place] [address] and I have been trying to reach you. The Employee Care Management [facility]) program is a free-of-charge confidential service provided to our employees and their family members covered by the [facility]. The program will provide an employee and his/her family with the [address] [place] [address] expertise to assist in navigating the health care delivery system, provider services, and their overall care needsso as to assure and improve health care interactions and enhance the quality of life. This program is designed to provide you with the opportunity to have a [address] [place] [address] care manager partner with you for the following services: 
 
 1) when you come home from the hospital or emergency room 2) when help is needed to manage your disease 3) when you need assistance coordinating services or appointments ECM now partners with [facility]. If you are a qualifying employee, you may receive an additional 10 wellness incentive points for every month of active participation with an Employee Care Manager. [address] [place] [address] is dedicated to empowering the good health of its community and improving the quality of care and care experiences for employees and their families. We are committed to safeguarding patient confidentiality and privacy, assuring that every employee has the respect he or she deserves in managing their health. The information shared with your care manager will not be shared with anyone else aside from those you identify as part of your care team, and will only be used to assist you with any identified care needs. Please contact me if you would like this service provided to you. Sincerely, Manav Dillard, RN Manav Dillard RN  Employee Care Manager [address] [place] [address], [address] [place] [address] [address] [facility] [facility] Cell [phone] Fax [email or web address]

## 2019-07-04 NOTE — PROGRESS NOTES
Attempted to contact patient to review urine culture results and offer antifungal; however, their VM box was full. Letter sent.

## 2019-07-12 ENCOUNTER — PATIENT OUTREACH (OUTPATIENT)
Dept: OTHER | Age: 19
End: 2019-07-12

## 2019-07-12 NOTE — PROGRESS NOTES
Patient identified as eligible for [address] [place] [address] services. Third telephone outreach attempted. Unable to leave a message, as VM is full. Will continue attempts to reach the patient.

## 2019-08-08 ENCOUNTER — PATIENT OUTREACH (OUTPATIENT)
Dept: OTHER | Age: 19
End: 2019-08-08

## 2019-08-08 NOTE — LETTER
8/8/2019 11:44 AM 
 
Ms. Maday Mata [address] [facility] [address] Dear Ms. Nikky Bennett, My name is Latesha Macias, Employee Care Manager for [facility] and I have been trying to reach you. The Employee Care Management [facility]) program is a free-of-charge confidential service provided to our employees and their family members covered by the [facility]. The program will provide an employee and his/her family with the [facility] expertise to assist in navigating the health care delivery system, provider services, and their overall care needsso as to assure and improve health care interactions and enhance the quality of life. This program is designed to provide you with the opportunity to have a [facility] care manager partner with you for the following services: 
 
 1) when you come home from the hospital or emergency room 2) when help is needed to manage your disease 3) when you need assistance coordinating services or appointments ECM now partners with [facility]. If you are a qualifying employee, you may receive an additional 10 wellness incentive points for every month of active participation with an Employee Care Manager. [facility] is dedicated to empowering the good health of its community and improving the quality of care and care experiences for employees and their families. We are committed to safeguarding patient confidentiality and privacy, assuring that every employee has the respect he or she deserves in managing their health. The information shared with your care manager will not be shared with anyone else aside from those you identify as part of your care team, and will only be used to assist you with any identified care needs. Please contact me if you would like this service provided to you. Sincerely, Latesha Macias, CHARLIE Macias RN  Employee Care Manager [address] [place] [address], [address] [place] [address] Cell [phone] Fax [email or web address]

## 2019-08-21 ENCOUNTER — APPOINTMENT (OUTPATIENT)
Dept: CT IMAGING | Age: 19
End: 2019-08-21
Attending: EMERGENCY MEDICINE
Payer: COMMERCIAL

## 2019-08-21 ENCOUNTER — HOSPITAL ENCOUNTER (EMERGENCY)
Age: 19
Discharge: HOME | End: 2019-08-21
Attending: EMERGENCY MEDICINE
Payer: COMMERCIAL

## 2019-08-21 VITALS
WEIGHT: 118.61 LBS | BODY MASS INDEX: 22.39 KG/M2 | OXYGEN SATURATION: 98 % | HEART RATE: 76 BPM | SYSTOLIC BLOOD PRESSURE: 106 MMHG | RESPIRATION RATE: 16 BRPM | DIASTOLIC BLOOD PRESSURE: 67 MMHG | HEIGHT: 61 IN | TEMPERATURE: 98 F

## 2019-08-21 DIAGNOSIS — F12.90 CANNABINOID HYPEREMESIS SYNDROME: Primary | ICD-10-CM

## 2019-08-21 DIAGNOSIS — R11.2 CANNABINOID HYPEREMESIS SYNDROME: Primary | ICD-10-CM

## 2019-08-21 LAB
ALBUMIN SERPL-MCNC: 4.4 G/DL (ref 3.5–5)
ALBUMIN/GLOB SERPL: 1.4 {RATIO} (ref 1.1–2.2)
ALP SERPL-CCNC: 71 U/L (ref 45–117)
ALT SERPL-CCNC: 25 U/L (ref 12–78)
AMORPH CRY URNS QL MICRO: ABNORMAL
ANION GAP SERPL CALC-SCNC: 14 MMOL/L (ref 5–15)
APPEARANCE UR: ABNORMAL
AST SERPL-CCNC: 21 U/L (ref 15–37)
BACTERIA URNS QL MICRO: ABNORMAL /HPF
BASOPHILS # BLD: 0.1 K/UL (ref 0–0.1)
BASOPHILS NFR BLD: 0 % (ref 0–1)
BILIRUB SERPL-MCNC: 1.7 MG/DL (ref 0.2–1)
BILIRUB UR QL: NEGATIVE
BUN SERPL-MCNC: 16 MG/DL (ref 6–20)
BUN/CREAT SERPL: 20 (ref 12–20)
CALCIUM SERPL-MCNC: 9.2 MG/DL (ref 8.5–10.1)
CHLORIDE SERPL-SCNC: 101 MMOL/L (ref 97–108)
CO2 SERPL-SCNC: 26 MMOL/L (ref 21–32)
COLOR UR: ABNORMAL
COMMENT, HOLDF: NORMAL
CREAT SERPL-MCNC: 0.82 MG/DL (ref 0.55–1.02)
DIFFERENTIAL METHOD BLD: ABNORMAL
EOSINOPHIL # BLD: 0.2 K/UL (ref 0–0.4)
EOSINOPHIL NFR BLD: 1 % (ref 0–7)
EPITH CASTS URNS QL MICRO: ABNORMAL /LPF
ERYTHROCYTE [DISTWIDTH] IN BLOOD BY AUTOMATED COUNT: 12.7 % (ref 11.5–14.5)
GLOBULIN SER CALC-MCNC: 3.2 G/DL (ref 2–4)
GLUCOSE SERPL-MCNC: 114 MG/DL (ref 65–100)
GLUCOSE UR STRIP.AUTO-MCNC: NEGATIVE MG/DL
HCG UR QL: NEGATIVE
HCT VFR BLD AUTO: 45 % (ref 35–47)
HGB BLD-MCNC: 15.4 G/DL (ref 11.5–16)
HGB UR QL STRIP: NEGATIVE
IMM GRANULOCYTES # BLD AUTO: 0.1 K/UL (ref 0–0.04)
IMM GRANULOCYTES NFR BLD AUTO: 0 % (ref 0–0.5)
KETONES UR QL STRIP.AUTO: 15 MG/DL
LEUKOCYTE ESTERASE UR QL STRIP.AUTO: NEGATIVE
LIPASE SERPL-CCNC: 92 U/L (ref 73–393)
LYMPHOCYTES # BLD: 1.1 K/UL (ref 0.8–3.5)
LYMPHOCYTES NFR BLD: 8 % (ref 12–49)
MCH RBC QN AUTO: 34.4 PG (ref 26–34)
MCHC RBC AUTO-ENTMCNC: 34.2 G/DL (ref 30–36.5)
MCV RBC AUTO: 100.4 FL (ref 80–99)
MONOCYTES # BLD: 0.6 K/UL (ref 0–1)
MONOCYTES NFR BLD: 4 % (ref 5–13)
NEUTS SEG # BLD: 11.4 K/UL (ref 1.8–8)
NEUTS SEG NFR BLD: 87 % (ref 32–75)
NITRITE UR QL STRIP.AUTO: NEGATIVE
NRBC # BLD: 0 K/UL (ref 0–0.01)
NRBC BLD-RTO: 0 PER 100 WBC
PH UR STRIP: 7.5 [PH] (ref 5–8)
PLATELET # BLD AUTO: 333 K/UL (ref 150–400)
PMV BLD AUTO: 10.3 FL (ref 8.9–12.9)
POTASSIUM SERPL-SCNC: 3.8 MMOL/L (ref 3.5–5.1)
PROT SERPL-MCNC: 7.6 G/DL (ref 6.4–8.2)
PROT UR STRIP-MCNC: NEGATIVE MG/DL
RBC # BLD AUTO: 4.48 M/UL (ref 3.8–5.2)
RBC #/AREA URNS HPF: ABNORMAL /HPF (ref 0–5)
SAMPLES BEING HELD,HOLD: NORMAL
SODIUM SERPL-SCNC: 141 MMOL/L (ref 136–145)
SP GR UR REFRACTOMETRY: 1.01 (ref 1–1.03)
UR CULT HOLD, URHOLD: NORMAL
UROBILINOGEN UR QL STRIP.AUTO: 0.2 EU/DL (ref 0.2–1)
WBC # BLD AUTO: 13.3 K/UL (ref 3.6–11)
WBC URNS QL MICRO: ABNORMAL /HPF (ref 0–4)

## 2019-08-21 PROCEDURE — 74011250636 HC RX REV CODE- 250/636: Performed by: EMERGENCY MEDICINE

## 2019-08-21 PROCEDURE — 99284 EMERGENCY DEPT VISIT MOD MDM: CPT

## 2019-08-21 PROCEDURE — 96375 TX/PRO/DX INJ NEW DRUG ADDON: CPT

## 2019-08-21 PROCEDURE — 81001 URINALYSIS AUTO W/SCOPE: CPT

## 2019-08-21 PROCEDURE — 74177 CT ABD & PELVIS W/CONTRAST: CPT

## 2019-08-21 PROCEDURE — 74011636320 HC RX REV CODE- 636/320: Performed by: EMERGENCY MEDICINE

## 2019-08-21 PROCEDURE — 96361 HYDRATE IV INFUSION ADD-ON: CPT

## 2019-08-21 PROCEDURE — 36415 COLL VENOUS BLD VENIPUNCTURE: CPT

## 2019-08-21 PROCEDURE — 74011000258 HC RX REV CODE- 258: Performed by: EMERGENCY MEDICINE

## 2019-08-21 PROCEDURE — 83690 ASSAY OF LIPASE: CPT

## 2019-08-21 PROCEDURE — 81025 URINE PREGNANCY TEST: CPT

## 2019-08-21 PROCEDURE — 96372 THER/PROPH/DIAG INJ SC/IM: CPT

## 2019-08-21 PROCEDURE — 96365 THER/PROPH/DIAG IV INF INIT: CPT

## 2019-08-21 PROCEDURE — 96366 THER/PROPH/DIAG IV INF ADDON: CPT

## 2019-08-21 PROCEDURE — 85025 COMPLETE CBC W/AUTO DIFF WBC: CPT

## 2019-08-21 PROCEDURE — 80053 COMPREHEN METABOLIC PANEL: CPT

## 2019-08-21 RX ORDER — DIPHENHYDRAMINE HYDROCHLORIDE 50 MG/ML
25 INJECTION, SOLUTION INTRAMUSCULAR; INTRAVENOUS
Status: COMPLETED | OUTPATIENT
Start: 2019-08-21 | End: 2019-08-21

## 2019-08-21 RX ORDER — METOCLOPRAMIDE 10 MG/1
10 TABLET ORAL
Qty: 12 TAB | Refills: 0 | Status: SHIPPED | OUTPATIENT
Start: 2019-08-21 | End: 2019-08-31

## 2019-08-21 RX ORDER — SODIUM CHLORIDE 0.9 % (FLUSH) 0.9 %
10 SYRINGE (ML) INJECTION ONCE
Status: COMPLETED | OUTPATIENT
Start: 2019-08-21 | End: 2019-08-21

## 2019-08-21 RX ORDER — CAPSAICIN 0.03 G/100G
CREAM TOPICAL
Qty: 60 G | Refills: 0 | Status: SHIPPED | OUTPATIENT
Start: 2019-08-21

## 2019-08-21 RX ORDER — HALOPERIDOL 5 MG/ML
5 INJECTION INTRAMUSCULAR
Status: COMPLETED | OUTPATIENT
Start: 2019-08-21 | End: 2019-08-21

## 2019-08-21 RX ORDER — ACETAMINOPHEN 500 MG
1000 TABLET ORAL
Qty: 20 TAB | Refills: 0 | Status: SHIPPED | OUTPATIENT
Start: 2019-08-21

## 2019-08-21 RX ADMIN — IOPAMIDOL 100 ML: 755 INJECTION, SOLUTION INTRAVENOUS at 11:49

## 2019-08-21 RX ADMIN — IOHEXOL 50 ML: 240 INJECTION, SOLUTION INTRATHECAL; INTRAVASCULAR; INTRAVENOUS; ORAL at 11:49

## 2019-08-21 RX ADMIN — SODIUM CHLORIDE 50 ML: 900 INJECTION, SOLUTION INTRAVENOUS at 11:49

## 2019-08-21 RX ADMIN — HALOPERIDOL LACTATE 5 MG: 5 INJECTION INTRAMUSCULAR at 09:54

## 2019-08-21 RX ADMIN — PROMETHAZINE HYDROCHLORIDE 12.5 MG: 25 INJECTION INTRAMUSCULAR; INTRAVENOUS at 10:42

## 2019-08-21 RX ADMIN — DIPHENHYDRAMINE HYDROCHLORIDE 25 MG: 50 INJECTION, SOLUTION INTRAMUSCULAR; INTRAVENOUS at 09:54

## 2019-08-21 RX ADMIN — SODIUM CHLORIDE 1000 ML: 900 INJECTION, SOLUTION INTRAVENOUS at 09:54

## 2019-08-21 RX ADMIN — Medication 10 ML: at 11:49

## 2019-08-21 NOTE — ED PROVIDER NOTES
The history is provided by the patient. Abdominal Pain    This is a recurrent problem. The current episode started 2 days ago. The problem occurs constantly. The problem has not changed since onset. The pain is associated with vomiting. The pain is located in the epigastric region. Associated symptoms include anorexia, diarrhea, nausea and vomiting. Pertinent negatives include no fever, no hematochezia, no melena, no dysuria, no frequency and no hematuria. Nothing worsens the pain. The pain is relieved by nothing. Past workup includes CT scan, ultrasound, surgery. Past medical history comments: diagnosed with enterocolitis by CT without oral contrast 2 months ago.         Past Medical History:   Diagnosis Date    Colitis     Major depressive disorder 4/25/2019    Psychiatric disorder 2019    Anxiety       Past Surgical History:   Procedure Laterality Date    COLONOSCOPY N/A 5/14/2019    [facility] ENDOSCOPY    COLONOSCOPY,DIAGNOSTIC  5/14/2019         HX CHOLECYSTECTOMY  05/30/2019    HX HEENT      wisdom teeth    UPPER GI ENDOSCOPY,BIOPSY  11/16/2018              Family History:   Problem Relation Age of Onset    Other Mother         divericulitis, gallbladder removal    No Known Problems Father     Crohn's Disease Maternal Grandmother 48    Other Maternal Grandmother         divericulitis and gallbladder removal    Stroke Maternal Grandfather     Hypertension Maternal Grandfather     High Cholesterol Maternal Grandfather     Cataract Maternal Grandfather     Stroke Paternal Grandmother     Cancer Paternal Grandmother 75        kidney, breast---age 61    No Known Problems Paternal Grandfather        Social History     Socioeconomic History    Marital status: SINGLE     Spouse name: Not on file    Number of children: Not on file    Years of education: Not on file    Highest education level: Not on file   Occupational History    Not on file   Social Needs  Financial resource strain: Not on file   Irving-Dodie insecurity:     Worry: Not on file     Inability: Not on file    Transportation needs:     Medical: Not on file     Non-medical: Not on file   Tobacco Use    Smoking status: Current Some Day Smoker     Packs/day: 0.50     Years: 1.00     Pack years: 0.50    Smokeless tobacco: Never Used    Tobacco comment: Per pt, \"occasionally\"   Substance and Sexual Activity    Alcohol use: Yes     Comment: Per pt, \"rarely\"    Drug use: Yes     Frequency: 7.0 times per week     Types: Marijuana, Prescription, OTC     Comment: usually every other day    Sexual activity: Yes     Partners: Male     Birth control/protection: IUD   Lifestyle    Physical activity:     Days per week: Not on file     Minutes per session: Not on file    Stress: Not on file   Relationships    Social connections:     Talks on phone: Not on file     Gets together: Not on file     Attends Zoroastrian service: Not on file     Active member of club or organization: Not on file     Attends meetings of clubs or organizations: Not on file     Relationship status: Not on file    Intimate partner violence:     Fear of current or ex partner: Not on file     Emotionally abused: Not on file     Physically abused: Not on file     Forced sexual activity: Not on file   Other Topics Concern    Not on file   Social History Narrative    Not on file         ALLERGIES: Patient has no known allergies. Review of Systems   Constitutional: Negative for fever. Gastrointestinal: Positive for abdominal pain, anorexia, diarrhea, nausea and vomiting. Negative for hematochezia and melena. Genitourinary: Negative for dysuria, frequency and hematuria. All other systems reviewed and are negative.       Vitals:    08/21/19 0929   BP: (!) 134/99   Pulse: 76   Resp: 16   Temp: 97.8 °F (36.6 °C)   SpO2: 99%   Weight: 53.8 kg (118 lb 9.7 oz)   Height: 5' 1\" (1.549 m)            Physical Exam   Constitutional: She appears well-developed and well-nourished. No distress. HENT:   Head: Normocephalic and atraumatic. Eyes: Conjunctivae are normal.   Neck: Neck supple. Cardiovascular: Normal rate, regular rhythm and normal heart sounds. Pulmonary/Chest: Effort normal and breath sounds normal. No respiratory distress. Abdominal: Soft. She exhibits no distension. There is tenderness (LUQ and epigastric). There is no rebound and no guarding. Musculoskeletal: Normal range of motion. She exhibits no deformity. Neurological: She is alert. No cranial nerve deficit. Skin: Skin is warm and dry. Psychiatric: She has a normal mood and affect. Her behavior is normal.   Nursing note and vitals reviewed. MDM     20 y.o. female presents with recurrent episodes of nausea and vomiting. she continues to use THC within the last few weeks. Mild dehydration and gagging are evident without signs of end-organ dysfunction. Not responding to supportive care measures at home. Offered Haldol and fluids for supportive care of suspected cannabinoid hyperemesis. No further vomiting episodes in the ED. Discussed hot showers, capsaicin and stopping THC consumption to help prevent recurrence. Mother accompanies and is concerned about prior CT findings, WBC is slightly elevated so will repeat for evaluation but on my review I believe bowel wall thickening found on CT 2 months ago may have been a non-clinical finding secondary to lack of oral contrast limited by lean habitus. Offered supportive care measures for home. Plan to follow up with PCP as needed and return precautions discussed for worsening or new concerning symptoms.       Procedures
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
## (undated) DEVICE — NEEDLE HYPO 22GA L1.5IN BLK S STL HUB POLYPR SHLD REG BVL

## (undated) DEVICE — GOWN,SIRUS,NONRNF,SETINSLV,2XL,18/CS: Brand: MEDLINE

## (undated) DEVICE — KENDALL RADIOLUCENT FOAM MONITORING ELECTRODE RECTANGULAR SHAPE: Brand: KENDALL

## (undated) DEVICE — REM POLYHESIVE ADULT PATIENT RETURN ELECTRODE: Brand: VALLEYLAB

## (undated) DEVICE — Device

## (undated) DEVICE — BLOCK BITE ENDOSCP AD 21 MM W/ DIL BLU LF DISP

## (undated) DEVICE — NEEDLE HYPO 18GA L1.5IN PNK S STL HUB POLYPR SHLD REG BVL

## (undated) DEVICE — SET ADMIN 16ML TBNG L100IN 2 Y INJ SITE IV PIGGY BK DISP

## (undated) DEVICE — SYR 10ML LUER LOK 1/5ML GRAD --

## (undated) DEVICE — 1200 GUARD II KIT W/5MM TUBE W/O VAC TUBE: Brand: GUARDIAN

## (undated) DEVICE — SUTURE SZ 0 27IN 5/8 CIR UR-6  TAPER PT VIOLET ABSRB VICRYL J603H

## (undated) DEVICE — SOLIDIFIER MEDC 1200ML -- CONVERT TO 356117

## (undated) DEVICE — BLADE ASSEMB CLP HAIR FINE --

## (undated) DEVICE — CATH IV AUTOGRD BC PNK 20GA 25 -- INSYTE

## (undated) DEVICE — TOWEL 4 PLY TISS 19X30 SUE WHT

## (undated) DEVICE — BAG SPEC BIOHZRD 10 X 10 IN --

## (undated) DEVICE — (D)PREP SKN CHLRAPRP APPL 26ML -- CONVERT TO ITEM 371833

## (undated) DEVICE — MEDI-VAC YANK SUCT HNDL W/TPRD BULBOUS TIP: Brand: CARDINAL HEALTH

## (undated) DEVICE — INFECTION CONTROL KIT SYS

## (undated) DEVICE — STERILE POLYISOPRENE POWDER-FREE SURGICAL GLOVES: Brand: PROTEXIS

## (undated) DEVICE — DRAPE XR C ARM 41X74IN LF --

## (undated) DEVICE — CONTAINER SPEC 20 ML LID NEUT BUFF FORMALIN 10 % POLYPR STS

## (undated) DEVICE — APPLIER CLP M L L11.4IN DIA10MM ENDOSCP ROT MULT FOR LIG

## (undated) DEVICE — Z DISCONTINUED PER MEDLINE LINE GAS SAMPLING O2/CO2 LNG AD 13 FT NSL W/ TBNG FILTERLINE

## (undated) DEVICE — APPLICATOR BNDG 1MM ADH PREMIERPRO EXOFIN

## (undated) DEVICE — SOLUTION IV 500ML 0.9% SOD CHL FLX CONT

## (undated) DEVICE — BLADELESS OPTICAL TROCAR WITH FIXATION CANNULA: Brand: VERSAPORT

## (undated) DEVICE — SURGICAL PROCEDURE KIT GEN LAPAROSCOPY LF

## (undated) DEVICE — NEONATAL-ADULT SPO2 SENSOR: Brand: NELLCOR

## (undated) DEVICE — BAG SPEC RETRV 275ML 10ML DISPOSABLE RELIACATCH

## (undated) DEVICE — STRAP POS KNEE BODY VELC

## (undated) DEVICE — SYR 3ML LL TIP 1/10ML GRAD --

## (undated) DEVICE — SYRINGE MED 20ML STD CLR PLAS LUERLOCK TIP N CTRL DISP

## (undated) DEVICE — SUTURE MCRYL SZ 4-0 L27IN ABSRB UD L19MM PS-2 1/2 CIR PRIM Y426H

## (undated) DEVICE — SOLUTION IRRIG 3000ML 0.9% SOD CHL FLX CONT 0797208] ICU MEDICAL INC]

## (undated) DEVICE — FORCEPS BX L160CM DIA8MM GRSP DISECT CUP TIP NONLOCKING ROT

## (undated) DEVICE — BASIN EMSIS 16OZ GRAPHITE PLAS KID SHP MOLD GRAD FOR ORAL

## (undated) DEVICE — KIT CHOLGM POLYUR W/ KARLAN BLLN CATH 4FR L60CM 5MM INTRO

## (undated) DEVICE — UNIVERSAL FIXATION CANNULA: Brand: VERSAONE

## (undated) DEVICE — HANDLE LT SNAP ON ULT DURABLE LENS FOR TRUMPF ALC DISPOSABLE

## (undated) DEVICE — ELECTRODE ES 36CM LAP FLAT L HK COAT DISP CLEANCOAT

## (undated) DEVICE — Z DISCONTINUED NO SUB IDED SET EXTN W/ 4 W STPCOCK M SPIN LOK 36IN

## (undated) DEVICE — KENDALL SCD EXPRESS SLEEVES, KNEE LENGTH, MEDIUM: Brand: KENDALL SCD

## (undated) DEVICE — BLADELESS OPTICAL TROCAR WITH FIXATION CANNULA: Brand: VERSAONE